# Patient Record
Sex: MALE | Race: WHITE | Employment: UNEMPLOYED | URBAN - METROPOLITAN AREA
[De-identification: names, ages, dates, MRNs, and addresses within clinical notes are randomized per-mention and may not be internally consistent; named-entity substitution may affect disease eponyms.]

---

## 2021-04-14 ENCOUNTER — APPOINTMENT (OUTPATIENT)
Dept: CT IMAGING | Age: 86
DRG: 446 | End: 2021-04-14
Payer: COMMERCIAL

## 2021-04-14 ENCOUNTER — HOSPITAL ENCOUNTER (INPATIENT)
Age: 86
LOS: 3 days | Discharge: HOME | DRG: 446 | End: 2021-04-17
Attending: INTERNAL MEDICINE | Admitting: INTERNAL MEDICINE
Payer: COMMERCIAL

## 2021-04-14 DIAGNOSIS — K81.0 ACUTE CHOLECYSTITIS: Primary | ICD-10-CM

## 2021-04-14 DIAGNOSIS — K81.0 ACUTE CHOLECYSTITIS: ICD-10-CM

## 2021-04-14 DIAGNOSIS — D72.829 LEUKOCYTOSIS, UNSPECIFIED TYPE: ICD-10-CM

## 2021-04-14 LAB
-: ABNORMAL
ABSOLUTE EOS #: <0.03 K/UL (ref 0–0.44)
ABSOLUTE IMMATURE GRANULOCYTE: 0.3 K/UL (ref 0–0.3)
ABSOLUTE LYMPH #: 0.94 K/UL (ref 1.1–3.7)
ABSOLUTE MONO #: 1.49 K/UL (ref 0.1–1.2)
ALBUMIN SERPL-MCNC: 3.9 G/DL (ref 3.5–5.2)
ALBUMIN/GLOBULIN RATIO: NORMAL (ref 1–2.5)
ALP BLD-CCNC: 92 U/L (ref 40–129)
ALT SERPL-CCNC: 10 U/L (ref 5–41)
AMORPHOUS: ABNORMAL
AMYLASE: 60 U/L (ref 28–100)
ANION GAP SERPL CALCULATED.3IONS-SCNC: 14 MMOL/L (ref 9–17)
AST SERPL-CCNC: 23 U/L
BACTERIA: ABNORMAL
BASOPHILS # BLD: 0 % (ref 0–2)
BASOPHILS ABSOLUTE: 0.06 K/UL (ref 0–0.2)
BILIRUB SERPL-MCNC: 1.06 MG/DL (ref 0.3–1.2)
BILIRUBIN DIRECT: 0.29 MG/DL
BILIRUBIN URINE: NEGATIVE
BILIRUBIN, INDIRECT: 0.77 MG/DL (ref 0–1)
BUN BLDV-MCNC: 16 MG/DL (ref 8–23)
BUN/CREAT BLD: 16 (ref 9–20)
CALCIUM SERPL-MCNC: 9.4 MG/DL (ref 8.6–10.4)
CASTS UA: ABNORMAL /LPF
CHLORIDE BLD-SCNC: 99 MMOL/L (ref 98–107)
CO2: 21 MMOL/L (ref 20–31)
COLOR: YELLOW
COMMENT UA: ABNORMAL
CREAT SERPL-MCNC: 1 MG/DL (ref 0.7–1.2)
CRYSTALS, UA: ABNORMAL /HPF
DIFFERENTIAL TYPE: ABNORMAL
EOSINOPHILS RELATIVE PERCENT: 0 % (ref 1–4)
EPITHELIAL CELLS UA: ABNORMAL /HPF (ref 0–5)
GFR AFRICAN AMERICAN: >60 ML/MIN
GFR NON-AFRICAN AMERICAN: >60 ML/MIN
GFR SERPL CREATININE-BSD FRML MDRD: ABNORMAL ML/MIN/{1.73_M2}
GFR SERPL CREATININE-BSD FRML MDRD: ABNORMAL ML/MIN/{1.73_M2}
GLOBULIN: NORMAL G/DL (ref 1.5–3.8)
GLUCOSE BLD-MCNC: 121 MG/DL (ref 70–99)
GLUCOSE URINE: NEGATIVE
HCT VFR BLD CALC: 38.3 % (ref 40.7–50.3)
HEMOGLOBIN: 12.9 G/DL (ref 13–17)
IMMATURE GRANULOCYTES: 1 %
KETONES, URINE: NEGATIVE
LEUKOCYTE ESTERASE, URINE: NEGATIVE
LIPASE: 23 U/L (ref 13–60)
LYMPHOCYTES # BLD: 5 % (ref 24–43)
MCH RBC QN AUTO: 32 PG (ref 25.2–33.5)
MCHC RBC AUTO-ENTMCNC: 33.7 G/DL (ref 28.4–34.8)
MCV RBC AUTO: 95 FL (ref 82.6–102.9)
MONOCYTES # BLD: 7 % (ref 3–12)
MUCUS: ABNORMAL
NITRITE, URINE: NEGATIVE
NRBC AUTOMATED: 0 PER 100 WBC
OTHER OBSERVATIONS UA: ABNORMAL
PDW BLD-RTO: 13.7 % (ref 11.8–14.4)
PH UA: 5 (ref 5–8)
PLATELET # BLD: 194 K/UL (ref 138–453)
PLATELET ESTIMATE: ABNORMAL
PMV BLD AUTO: 10.8 FL (ref 8.1–13.5)
POTASSIUM SERPL-SCNC: 4.1 MMOL/L (ref 3.7–5.3)
PROTEIN UA: ABNORMAL
RBC # BLD: 4.03 M/UL (ref 4.21–5.77)
RBC # BLD: ABNORMAL 10*6/UL
RBC UA: ABNORMAL /HPF (ref 0–2)
RENAL EPITHELIAL, UA: ABNORMAL /HPF
SEG NEUTROPHILS: 87 % (ref 36–65)
SEGMENTED NEUTROPHILS ABSOLUTE COUNT: 17.94 K/UL (ref 1.5–8.1)
SODIUM BLD-SCNC: 134 MMOL/L (ref 135–144)
SPECIFIC GRAVITY UA: 1.03 (ref 1–1.03)
TOTAL PROTEIN: 7.6 G/DL (ref 6.4–8.3)
TRICHOMONAS: ABNORMAL
TURBIDITY: CLEAR
URINE HGB: ABNORMAL
UROBILINOGEN, URINE: NORMAL
WBC # BLD: 20.8 K/UL (ref 3.5–11.3)
WBC # BLD: ABNORMAL 10*3/UL
WBC UA: ABNORMAL /HPF (ref 0–5)
YEAST: ABNORMAL

## 2021-04-14 PROCEDURE — 1200000000 HC SEMI PRIVATE

## 2021-04-14 PROCEDURE — 83690 ASSAY OF LIPASE: CPT

## 2021-04-14 PROCEDURE — 6360000004 HC RX CONTRAST MEDICATION: Performed by: NURSE PRACTITIONER

## 2021-04-14 PROCEDURE — 85025 COMPLETE CBC W/AUTO DIFF WBC: CPT

## 2021-04-14 PROCEDURE — 96365 THER/PROPH/DIAG IV INF INIT: CPT

## 2021-04-14 PROCEDURE — 6360000002 HC RX W HCPCS: Performed by: NURSE PRACTITIONER

## 2021-04-14 PROCEDURE — 74177 CT ABD & PELVIS W/CONTRAST: CPT

## 2021-04-14 PROCEDURE — 81001 URINALYSIS AUTO W/SCOPE: CPT

## 2021-04-14 PROCEDURE — 80048 BASIC METABOLIC PNL TOTAL CA: CPT

## 2021-04-14 PROCEDURE — 2580000003 HC RX 258: Performed by: NURSE PRACTITIONER

## 2021-04-14 PROCEDURE — 99283 EMERGENCY DEPT VISIT LOW MDM: CPT

## 2021-04-14 PROCEDURE — 80076 HEPATIC FUNCTION PANEL: CPT

## 2021-04-14 PROCEDURE — 82150 ASSAY OF AMYLASE: CPT

## 2021-04-14 RX ORDER — ALBUTEROL SULFATE 90 UG/1
AEROSOL, METERED RESPIRATORY (INHALATION)
COMMUNITY
Start: 2020-07-20

## 2021-04-14 RX ORDER — SODIUM CHLORIDE 0.9 % (FLUSH) 0.9 %
10 SYRINGE (ML) INJECTION PRN
Status: DISCONTINUED | OUTPATIENT
Start: 2021-04-14 | End: 2021-04-15 | Stop reason: SDUPTHER

## 2021-04-14 RX ORDER — METOPROLOL SUCCINATE 25 MG/1
12.5 TABLET, EXTENDED RELEASE ORAL NIGHTLY
COMMUNITY
Start: 2021-03-24

## 2021-04-14 RX ORDER — 0.9 % SODIUM CHLORIDE 0.9 %
50 INTRAVENOUS SOLUTION INTRAVENOUS ONCE
Status: COMPLETED | OUTPATIENT
Start: 2021-04-14 | End: 2021-04-14

## 2021-04-14 RX ORDER — DIGOXIN 125 MCG
TABLET ORAL
COMMUNITY
Start: 2021-02-17

## 2021-04-14 RX ORDER — SODIUM CHLORIDE 9 MG/ML
INJECTION, SOLUTION INTRAVENOUS CONTINUOUS
Status: DISCONTINUED | OUTPATIENT
Start: 2021-04-14 | End: 2021-04-15 | Stop reason: SDUPTHER

## 2021-04-14 RX ADMIN — SODIUM CHLORIDE 50 ML: 9 INJECTION, SOLUTION INTRAVENOUS at 21:59

## 2021-04-14 RX ADMIN — IOPAMIDOL 75 ML: 755 INJECTION, SOLUTION INTRAVENOUS at 21:59

## 2021-04-14 RX ADMIN — PIPERACILLIN SODIUM AND TAZOBACTAM SODIUM 4500 MG: 4; .5 INJECTION, POWDER, LYOPHILIZED, FOR SOLUTION INTRAVENOUS at 23:15

## 2021-04-14 RX ADMIN — SODIUM CHLORIDE, PRESERVATIVE FREE 10 ML: 5 INJECTION INTRAVENOUS at 21:59

## 2021-04-14 RX ADMIN — SODIUM CHLORIDE: 9 INJECTION, SOLUTION INTRAVENOUS at 21:16

## 2021-04-14 ASSESSMENT — ENCOUNTER SYMPTOMS
SORE THROAT: 0
ABDOMINAL PAIN: 1
CONSTIPATION: 0
SINUS PRESSURE: 0
VOMITING: 0
WHEEZING: 0
COUGH: 0
SHORTNESS OF BREATH: 0
COLOR CHANGE: 0
DIARRHEA: 0
RHINORRHEA: 0
NAUSEA: 1

## 2021-04-14 ASSESSMENT — PAIN SCALES - GENERAL: PAINLEVEL_OUTOF10: 6

## 2021-04-15 PROBLEM — I48.91 ATRIAL FIBRILLATION (HCC): Status: ACTIVE | Noted: 2021-04-15

## 2021-04-15 PROBLEM — Z78.9 LANGUAGE BARRIER: Status: ACTIVE | Noted: 2021-04-15

## 2021-04-15 PROBLEM — J44.9 COPD (CHRONIC OBSTRUCTIVE PULMONARY DISEASE) (HCC): Status: ACTIVE | Noted: 2021-04-15

## 2021-04-15 PROBLEM — F01.50 VASCULAR DEMENTIA (HCC): Status: ACTIVE | Noted: 2021-04-15

## 2021-04-15 PROBLEM — D72.829 LEUKOCYTOSIS: Status: ACTIVE | Noted: 2021-04-15

## 2021-04-15 PROBLEM — I25.10 CAD (CORONARY ARTERY DISEASE): Status: ACTIVE | Noted: 2021-04-15

## 2021-04-15 LAB
ALBUMIN SERPL-MCNC: 3.3 G/DL (ref 3.5–5.2)
ALBUMIN/GLOBULIN RATIO: ABNORMAL (ref 1–2.5)
ALP BLD-CCNC: 79 U/L (ref 40–129)
ALT SERPL-CCNC: 7 U/L (ref 5–41)
AMYLASE: 48 U/L (ref 28–100)
ANION GAP SERPL CALCULATED.3IONS-SCNC: 12 MMOL/L (ref 9–17)
AST SERPL-CCNC: 18 U/L
BILIRUB SERPL-MCNC: 1.12 MG/DL (ref 0.3–1.2)
BUN BLDV-MCNC: 13 MG/DL (ref 8–23)
BUN/CREAT BLD: 14 (ref 9–20)
CALCIUM IONIZED: 1.2 MMOL/L (ref 1.13–1.33)
CALCIUM SERPL-MCNC: 8.7 MG/DL (ref 8.6–10.4)
CHLORIDE BLD-SCNC: 102 MMOL/L (ref 98–107)
CO2: 20 MMOL/L (ref 20–31)
CREAT SERPL-MCNC: 0.9 MG/DL (ref 0.7–1.2)
DIGOXIN DATE LAST DOSE: ABNORMAL
DIGOXIN DOSE AMOUNT: ABNORMAL
DIGOXIN DOSE TIME: ABNORMAL
DIGOXIN LEVEL: 0.4 NG/ML (ref 0.5–2)
GFR AFRICAN AMERICAN: >60 ML/MIN
GFR NON-AFRICAN AMERICAN: >60 ML/MIN
GFR SERPL CREATININE-BSD FRML MDRD: ABNORMAL ML/MIN/{1.73_M2}
GFR SERPL CREATININE-BSD FRML MDRD: ABNORMAL ML/MIN/{1.73_M2}
GLUCOSE BLD-MCNC: 110 MG/DL (ref 70–99)
HCT VFR BLD CALC: 37.4 % (ref 40.7–50.3)
HEMOGLOBIN: 12.7 G/DL (ref 13–17)
INR BLD: 1.4
LIPASE: 20 U/L (ref 13–60)
MAGNESIUM: 1.5 MG/DL (ref 1.6–2.6)
MCH RBC QN AUTO: 31.6 PG (ref 25.2–33.5)
MCHC RBC AUTO-ENTMCNC: 34 G/DL (ref 28.4–34.8)
MCV RBC AUTO: 93 FL (ref 82.6–102.9)
NRBC AUTOMATED: 0 PER 100 WBC
PDW BLD-RTO: 13.4 % (ref 11.8–14.4)
PHOSPHORUS: 2.1 MG/DL (ref 2.5–4.5)
PLATELET # BLD: 180 K/UL (ref 138–453)
PMV BLD AUTO: 10.4 FL (ref 8.1–13.5)
POTASSIUM SERPL-SCNC: 3.4 MMOL/L (ref 3.7–5.3)
PROTHROMBIN TIME: 16.5 SEC (ref 11.5–14.2)
RBC # BLD: 4.02 M/UL (ref 4.21–5.77)
SODIUM BLD-SCNC: 134 MMOL/L (ref 135–144)
TOTAL PROTEIN: 6.6 G/DL (ref 6.4–8.3)
TRIGL SERPL-MCNC: 43 MG/DL
WBC # BLD: 18.3 K/UL (ref 3.5–11.3)

## 2021-04-15 PROCEDURE — 82330 ASSAY OF CALCIUM: CPT

## 2021-04-15 PROCEDURE — 6360000002 HC RX W HCPCS: Performed by: NURSE PRACTITIONER

## 2021-04-15 PROCEDURE — 99223 1ST HOSP IP/OBS HIGH 75: CPT | Performed by: INTERNAL MEDICINE

## 2021-04-15 PROCEDURE — 85027 COMPLETE CBC AUTOMATED: CPT

## 2021-04-15 PROCEDURE — 6370000000 HC RX 637 (ALT 250 FOR IP): Performed by: NURSE PRACTITIONER

## 2021-04-15 PROCEDURE — 85610 PROTHROMBIN TIME: CPT

## 2021-04-15 PROCEDURE — 1200000000 HC SEMI PRIVATE

## 2021-04-15 PROCEDURE — 2580000003 HC RX 258: Performed by: NURSE PRACTITIONER

## 2021-04-15 PROCEDURE — 36415 COLL VENOUS BLD VENIPUNCTURE: CPT

## 2021-04-15 PROCEDURE — APPSS45 APP SPLIT SHARED TIME 31-45 MINUTES: Performed by: NURSE PRACTITIONER

## 2021-04-15 PROCEDURE — 80162 ASSAY OF DIGOXIN TOTAL: CPT

## 2021-04-15 PROCEDURE — 84478 ASSAY OF TRIGLYCERIDES: CPT

## 2021-04-15 PROCEDURE — 83690 ASSAY OF LIPASE: CPT

## 2021-04-15 PROCEDURE — 2500000003 HC RX 250 WO HCPCS: Performed by: NURSE PRACTITIONER

## 2021-04-15 PROCEDURE — 83735 ASSAY OF MAGNESIUM: CPT

## 2021-04-15 PROCEDURE — 84100 ASSAY OF PHOSPHORUS: CPT

## 2021-04-15 PROCEDURE — 80053 COMPREHEN METABOLIC PANEL: CPT

## 2021-04-15 PROCEDURE — 82150 ASSAY OF AMYLASE: CPT

## 2021-04-15 RX ORDER — POTASSIUM CHLORIDE 7.45 MG/ML
10 INJECTION INTRAVENOUS PRN
Status: DISCONTINUED | OUTPATIENT
Start: 2021-04-15 | End: 2021-04-17 | Stop reason: HOSPADM

## 2021-04-15 RX ORDER — DIPHENHYDRAMINE HYDROCHLORIDE 50 MG/ML
25 INJECTION INTRAMUSCULAR; INTRAVENOUS ONCE
Status: COMPLETED | OUTPATIENT
Start: 2021-04-15 | End: 2021-04-15

## 2021-04-15 RX ORDER — METOPROLOL SUCCINATE 25 MG/1
12.5 TABLET, EXTENDED RELEASE ORAL NIGHTLY
Status: DISCONTINUED | OUTPATIENT
Start: 2021-04-15 | End: 2021-04-17 | Stop reason: HOSPADM

## 2021-04-15 RX ORDER — SODIUM CHLORIDE 0.9 % (FLUSH) 0.9 %
5-40 SYRINGE (ML) INJECTION EVERY 12 HOURS SCHEDULED
Status: DISCONTINUED | OUTPATIENT
Start: 2021-04-15 | End: 2021-04-17 | Stop reason: HOSPADM

## 2021-04-15 RX ORDER — LANOLIN ALCOHOL/MO/W.PET/CERES
3 CREAM (GRAM) TOPICAL NIGHTLY PRN
Status: DISCONTINUED | OUTPATIENT
Start: 2021-04-15 | End: 2021-04-17 | Stop reason: HOSPADM

## 2021-04-15 RX ORDER — SODIUM CHLORIDE 0.9 % (FLUSH) 0.9 %
5-40 SYRINGE (ML) INJECTION PRN
Status: DISCONTINUED | OUTPATIENT
Start: 2021-04-15 | End: 2021-04-17 | Stop reason: HOSPADM

## 2021-04-15 RX ORDER — PROMETHAZINE HYDROCHLORIDE 12.5 MG/1
12.5 TABLET ORAL EVERY 6 HOURS PRN
Status: DISCONTINUED | OUTPATIENT
Start: 2021-04-15 | End: 2021-04-17 | Stop reason: HOSPADM

## 2021-04-15 RX ORDER — MAGNESIUM SULFATE 1 G/100ML
1000 INJECTION INTRAVENOUS PRN
Status: DISCONTINUED | OUTPATIENT
Start: 2021-04-15 | End: 2021-04-17 | Stop reason: HOSPADM

## 2021-04-15 RX ORDER — ALBUTEROL SULFATE 2.5 MG/3ML
2.5 SOLUTION RESPIRATORY (INHALATION)
Status: DISCONTINUED | OUTPATIENT
Start: 2021-04-15 | End: 2021-04-15

## 2021-04-15 RX ORDER — HYDROCODONE BITARTRATE AND ACETAMINOPHEN 5; 325 MG/1; MG/1
1 TABLET ORAL EVERY 4 HOURS PRN
Status: DISCONTINUED | OUTPATIENT
Start: 2021-04-15 | End: 2021-04-17 | Stop reason: HOSPADM

## 2021-04-15 RX ORDER — DIGOXIN 125 MCG
125 TABLET ORAL DAILY
Status: DISCONTINUED | OUTPATIENT
Start: 2021-04-15 | End: 2021-04-17 | Stop reason: HOSPADM

## 2021-04-15 RX ORDER — ALBUTEROL SULFATE 90 UG/1
2 AEROSOL, METERED RESPIRATORY (INHALATION) EVERY 6 HOURS PRN
Status: DISCONTINUED | OUTPATIENT
Start: 2021-04-15 | End: 2021-04-17 | Stop reason: HOSPADM

## 2021-04-15 RX ORDER — HYDROCODONE BITARTRATE AND ACETAMINOPHEN 5; 325 MG/1; MG/1
2 TABLET ORAL EVERY 4 HOURS PRN
Status: DISCONTINUED | OUTPATIENT
Start: 2021-04-15 | End: 2021-04-17 | Stop reason: HOSPADM

## 2021-04-15 RX ORDER — SODIUM CHLORIDE 9 MG/ML
INJECTION, SOLUTION INTRAVENOUS CONTINUOUS
Status: DISCONTINUED | OUTPATIENT
Start: 2021-04-15 | End: 2021-04-17

## 2021-04-15 RX ORDER — ACETAMINOPHEN 325 MG/1
650 TABLET ORAL EVERY 4 HOURS PRN
Status: DISCONTINUED | OUTPATIENT
Start: 2021-04-15 | End: 2021-04-17 | Stop reason: HOSPADM

## 2021-04-15 RX ORDER — SODIUM CHLORIDE 9 MG/ML
25 INJECTION, SOLUTION INTRAVENOUS PRN
Status: DISCONTINUED | OUTPATIENT
Start: 2021-04-15 | End: 2021-04-17 | Stop reason: HOSPADM

## 2021-04-15 RX ORDER — ONDANSETRON 2 MG/ML
4 INJECTION INTRAMUSCULAR; INTRAVENOUS EVERY 6 HOURS PRN
Status: DISCONTINUED | OUTPATIENT
Start: 2021-04-15 | End: 2021-04-17 | Stop reason: HOSPADM

## 2021-04-15 RX ADMIN — PIPERACILLIN AND TAZOBACTAM 3375 MG: 3; .375 INJECTION, POWDER, LYOPHILIZED, FOR SOLUTION INTRAVENOUS at 06:02

## 2021-04-15 RX ADMIN — METOPROLOL SUCCINATE 12.5 MG: 25 TABLET, EXTENDED RELEASE ORAL at 20:51

## 2021-04-15 RX ADMIN — FAMOTIDINE 20 MG: 10 INJECTION, SOLUTION INTRAVENOUS at 16:10

## 2021-04-15 RX ADMIN — SODIUM CHLORIDE: 9 INJECTION, SOLUTION INTRAVENOUS at 16:08

## 2021-04-15 RX ADMIN — DIPHENHYDRAMINE HYDROCHLORIDE 25 MG: 50 INJECTION, SOLUTION INTRAMUSCULAR; INTRAVENOUS at 20:51

## 2021-04-15 RX ADMIN — DIGOXIN 125 MCG: 125 TABLET ORAL at 16:10

## 2021-04-15 RX ADMIN — PIPERACILLIN AND TAZOBACTAM 3375 MG: 3; .375 INJECTION, POWDER, LYOPHILIZED, FOR SOLUTION INTRAVENOUS at 16:10

## 2021-04-15 RX ADMIN — PIPERACILLIN AND TAZOBACTAM 3375 MG: 3; .375 INJECTION, POWDER, LYOPHILIZED, FOR SOLUTION INTRAVENOUS at 23:30

## 2021-04-15 NOTE — PROGRESS NOTES
Writer attempted to contact the pts daughter Lang Pradhan at . A message was let, awaiting a return phone call.

## 2021-04-15 NOTE — CONSULTS
General Surgery:  Consult Note        PATIENT NAME: Shawn Lora   YOB: 1930    ADMISSION DATE: 4/14/2021  8:35 PM     Consulted Physician: Dr. Mandy Spain DATE: 4/15/2021    Chief Complaint:  Abdominal pain  Consult Regarding:  Cholecystitis     HISTORY OF PRESENT ILLNESS:  The patient is a 80 y.o. male non-english speaking male. History taken from chart review, attempted to call daughter w/out answer at this time. Pt presented to ED last night with abdominal pain for > 3 days. Pain started Monday after eating \"a lot of food\". Had nausea but no emesis. Bowels and bladder working normal per him. No fevers or chills. Pt lives in assisted living, able to preform his own adls. H/o CVA, vasc dementia, afib on eliquis, copd, cad, htn. Has had exploratory laparotomy, which sternal wires used to close the fascia. Past Medical History:        Diagnosis Date    Atrial fibrillation ([facility]     CAD (coronary artery disease)     COPD (chronic obstructive pulmonary disease) (HCC)     CVA (cerebral vascular accident) ([facility]     Hypertension     NSTEMI (non-ST elevated myocardial infarction) ([facility] 2016    Vascular dementia ([facility]        Past Surgical History:        Procedure Laterality Date    APPENDECTOMY      HERNIA REPAIR      HIP FUSION         Medications Prior to Admission:   Not in a hospital admission. Allergies:  Patient has no known allergies.     Social History:   Social History     Socioeconomic History    Marital status:      Spouse name: Not on file    Number of children: Not on file    Years of education: Not on file    Highest education level: Not on file   Occupational History    Not on file   Social Needs    Financial resource strain: Not on file    Food insecurity     Worry: Not on file     Inability: Not on file    Transportation needs     Medical: Not on file     Non-medical: Not on file   Tobacco Use    Smoking status: Never Smoker   Substance and Sexual Activity    Alcohol use: Not Currently    Drug use: Not on file    Sexual activity: Not on file   Lifestyle    Physical activity     Days per week: Not on file     Minutes per session: Not on file    Stress: Not on file   Relationships    Social connections     Talks on phone: Not on file     Gets together: Not on file     Attends [employer]ism service: Not on file     Active member of club or organization: Not on file     Attends meetings of clubs or organizations: Not on file     Relationship status: Not on file    Intimate partner violence     Fear of current or ex partner: Not on file     Emotionally abused: Not on file     Physically abused: Not on file     Forced sexual activity: Not on file   Other Topics Concern    Not on file   Social History Narrative    Not on file       Family History:   History reviewed. No pertinent family history. REVIEW OF SYSTEMS:    Unable to obtain due to language barrier. PHYSICAL EXAM:    VITALS:  /69   Pulse 96   Temp 98.4 °F (36.9 °C) (Oral)   Resp 14   Ht 5' 10\" (1.778 m)   Wt 130 lb (59 kg)   SpO2 93%   BMI 18.65 kg/m²   INTAKE/OUTPUT:   No intake or output data in the 24 hours ending 04/15/21 0647    CONSTITUTIONAL:  awake, alert, not distressed and normal weight  HEENT: Normocephalic/atraumatic, without obvious abnormality. NECK:  Supple, symmetrical, trachea midline   CARDIOVASCULAR: irregularly irregular s1+s2  LUNGS: equal and symmetric chest rise/fall, non-labored. ABDOMEN: soft, nd, ttp w/ guarding RUQ, +arellano sign. MUSCULOSKELETAL: Muscle strength intact in all extremities bilaterally. NEUROLOGIC: Gross motor intact without focal weakness. SKIN: No cyanosis, rashes, or edema noted.      CBC with Differential:    Lab Results   Component Value Date    WBC 20.8 04/14/2021    RBC 4.03 04/14/2021    HGB 12.9 04/14/2021    HCT 38.3 04/14/2021     04/14/2021    MCV 95.0 04/14/2021    MCH 32.0 04/14/2021    MCHC 33.7 04/14/2021    RDW 13.7 04/14/2021    LYMPHOPCT 5 04/14/2021    MONOPCT 7 04/14/2021    BASOPCT 0 04/14/2021    MONOSABS 1.49 04/14/2021    LYMPHSABS 0.94 04/14/2021    EOSABS <0.03 04/14/2021    BASOSABS 0.06 04/14/2021    DIFFTYPE NOT REPORTED 04/14/2021     BMP:    Lab Results   Component Value Date     04/14/2021    K 4.1 04/14/2021    CL 99 04/14/2021    CO2 21 04/14/2021    BUN 16 04/14/2021    LABALBU 3.9 04/14/2021    CREATININE 1.00 04/14/2021    CALCIUM 9.4 04/14/2021    GFRAA >60 04/14/2021    LABGLOM >60 04/14/2021    GLUCOSE 121 04/14/2021     Hepatic Function Panel:    Lab Results   Component Value Date    ALKPHOS 92 04/14/2021    ALT 10 04/14/2021    AST 23 04/14/2021    PROT 7.6 04/14/2021    BILITOT 1.06 04/14/2021    BILIDIR 0.29 04/14/2021    IBILI 0.77 04/14/2021    LABALBU 3.9 04/14/2021       Pertinent Radiology:   Ct Abdomen Pelvis W Iv Contrast Additional Contrast? None    Result Date: 4/14/2021  EXAMINATION: CT OF THE ABDOMEN AND PELVIS WITH CONTRAST 4/14/2021 9:58 pm TECHNIQUE: CT of the abdomen and pelvis was performed with the administration of intravenous contrast. Multiplanar reformatted images are provided for review. Dose modulation, iterative reconstruction, and/or weight based adjustment of the mA/kV was utilized to reduce the radiation dose to as low as reasonably achievable. COMPARISON: None. HISTORY: ORDERING SYSTEM PROVIDED HISTORY: abd pain TECHNOLOGIST PROVIDED HISTORY: [facility] pain Decision Support Exception->Emergency Medical Condition (MA) Reason for Exam: pain Acuity: Unknown Type of Exam: Unknown FINDINGS: Lower Chest: Calcific coronary artery disease. 13 x 27 mm nodule right lung base. Organs: Metallic wires midline anterior abdominal wall incision. The liver, spleen, pancreas, and adrenals appear normal.  Gallbladder is distended with pericholecystic fluid. Gallbladder sludge or gravel noted.  Kidneys appear normal. The bladder appears normal. GI/Bowel: Air-fluid levels noted in the small large bowel. Stomach normal.. Colonic diverticulosis. Appendix not identified. Pelvis: Prostate prominent. Peritoneum/Retroperitoneum: The abdominal aorta and iliac arteries are normal in caliber. There is no pathologic adenopathy. Bones/Soft Tissues: Remote fracture left hip with superior migration and severe DJD. Moderate dextroconvex scoliosis. 13 x 27 mm nodule right lung base. Acute cholecystitis. Recommend gallbladder ultrasound. Postop ileus. RECOMMENDATIONS: 20 mm right solid pulmonary nodule detected on incomplete chest CT. Recommend prompt non-contrast Chest CT for further evaluation. These guidelines do not apply to immunocompromised patients and patients with cancer. Follow up in patients with significant comorbidities as clinically warranted. For lung cancer screening, adhere to Lung-RADS guidelines. Reference: Radiology. 2017; 284(1):228-43. ASSESSMENT:  Active Hospital Problems    Diagnosis Date Noted    Atrial fibrillation [facility]) [I48.91] 04/15/2021    COPD (chronic obstructive pulmonary disease) (HCC) [J44.9] 04/15/2021    CAD (coronary artery disease) [I25.10] 04/15/2021    Vascular dementia ([facility] [facility] [F01.50] 04/15/2021    Leukocytosis [D72.829] 04/15/2021    Acute cholecystitis [K81.0] 04/14/2021       1. 89 w/ acute cholecystitis     Plan:  1. Continue medical mgmt and supportive care per primary  2. Hold eliquis  3. Npo  4. rec IR perc cholecystotomy tube give length of symptoms, age, and medical comorbidities   5. Abx: zosyn      Electronically signed by Lakisha Brenner DO  on 4/15/2021 at 6:47 AM     Attending Physician Statement  I have discussed the case, including pertinent history and exam findings with the resident. I agree with the assessment, plan and orders as documented by the resident. Patient seen and examined.   Extensive inflammatory process that has been ongoing for several days prior to his presentation to the hospital. Discussed with the patient's daughter, Renetta Parkinson and she understands the difficult situation. We have held his Eliquis with plans for percutaneous cholecystostomy tube tomorrow afternoon.  Interventional radiology has been consulted

## 2021-04-15 NOTE — ED PROVIDER NOTES
[address] [place] [address] ED  eMERGENCY dEPARTMENT eNCOUnter      Pt Name: Chelsea Flores  MRN: [id number]  Sherlygfkailee 10/16/1930  Date of evaluation: 4/14/2021  Provider: [address] [place] [address] NP, CHELSEY Ribera [id number]       Chief Complaint   Patient presents with    Abdominal Pain         HISTORY OF PRESENT ILLNESS  (Location/Symptom, Timing/Onset, Context/Setting, Quality, Duration, Modifying Factors, Severity.)   Chelsea Flores is a 80 y.o. male who presents to the emergency department private vehicle for evaluation of abdominal pain. Patient has generalized abdominal pain that has had for the last 3 days. The daughter who interprets states that he has had some nausea without vomiting. She states that he has a history of a hernia repair and an appendectomy. He did develop a bowel obstruction after hernia repair that required a small segment of his bowels to be removed. He states that he is also had some low-grade fevers. Nursing Notes were reviewed. ALLERGIES     Patient has no known allergies. CURRENT MEDICATIONS       Previous Medications    ALBUTEROL SULFATE  (90 BASE) MCG/ACT INHALER    INHALE 2 PUFFS EVERY 4-6 HOURS AS NEEDED FOR WHEEZE    APIXABAN (ELIQUIS) 2.5 MG TABS TABLET    Take 2.5 mg by mouth 2 times daily    DIGOXIN (LANOXIN) 125 MCG TABLET    take 1 tablet by mouth once daily    METOPROLOL SUCCINATE (TOPROL XL) 25 MG EXTENDED RELEASE TABLET    Take 12.5 mg by mouth nightly       PAST MEDICAL HISTORY         Diagnosis Date    CVA (cerebral vascular accident) ([address]     Hypertension        SURGICAL HISTORY           Procedure Laterality Date    APPENDECTOMY      HERNIA REPAIR      HIP FUSION           FAMILY HISTORY     History reviewed. No pertinent family history. No family status information on file. SOCIAL HISTORY      reports that he has never smoked. He does not have any smokeless tobacco history on file. He reports previous alcohol use.     REVIEW OF SYSTEMS (2-9 systems for level 4, 10 or more for level 5)     Review of Systems   Constitutional: Negative for chills, fever and unexpected weight change. HENT: Negative for congestion, rhinorrhea, sinus pressure and sore throat. Respiratory: Negative for cough, shortness of breath and wheezing. Cardiovascular: Negative for chest pain and palpitations. Gastrointestinal: Positive for abdominal pain and nausea. Negative for constipation, diarrhea and vomiting. Genitourinary: Negative for dysuria and hematuria. Musculoskeletal: Negative for arthralgias and myalgias. Skin: Negative for color change and rash. Neurological: Negative for dizziness, weakness and headaches. Hematological: Negative for adenopathy. All other systems reviewed and are negative. Except as noted above the remainder of the review of systems was reviewed and negative. PHYSICAL EXAM    (up to 7 for level 4, 8 or more for level 5)     ED Triage Vitals   BP Temp Temp src Pulse Resp SpO2 Height Weight   04/14/21 1830 04/14/21 1830 -- 04/14/21 1830 04/14/21 1830 04/14/21 1830 -- 04/14/21 1829   (!) 124/56 100 °F (37.8 °C)  82 18 94 %  130 lb (59 kg)       Physical Exam  Vitals signs reviewed. Constitutional:       Appearance: He is well-developed. HENT:      Head: Normocephalic and atraumatic. Eyes:      Conjunctiva/sclera: Conjunctivae normal.      Pupils: Pupils are equal, round, and reactive to light. Neck:      Musculoskeletal: Normal range of motion and neck supple. Cardiovascular:      Rate and Rhythm: Normal rate and regular rhythm. Pulmonary:      Effort: Pulmonary effort is normal. No respiratory distress. Breath sounds: Normal breath sounds. No stridor. Abdominal:      General: Bowel sounds are normal.      Palpations: Abdomen is soft. Tenderness: There is generalized abdominal tenderness. Musculoskeletal: Normal range of motion. Lymphadenopathy:      Cervical: No cervical adenopathy. Skin:     General: Skin is warm and dry. Findings: No rash. Neurological:      Mental Status: He is alert and oriented to person, place, and time. RADIOLOGY:   Non-plain film images such as CT, Ultrasound and MRI are read by the radiologist. Kulwinder Elena radiographic images are visualized and preliminarily interpreted by the emergency physician with the below findings:    Ct Abdomen Pelvis W Iv Contrast Additional Contrast? None    Result Date: 4/14/2021  EXAMINATION: CT OF THE ABDOMEN AND PELVIS WITH CONTRAST 4/14/2021 9:58 pm TECHNIQUE: CT of the abdomen and pelvis was performed with the administration of intravenous contrast. Multiplanar reformatted images are provided for review. Dose modulation, iterative reconstruction, and/or weight based adjustment of the mA/kV was utilized to reduce the radiation dose to as low as reasonably achievable. COMPARISON: None. HISTORY: ORDERING SYSTEM PROVIDED HISTORY: [facility] pain TECHNOLOGIST PROVIDED HISTORY: [facility] pain Decision Support Exception->Emergency Medical Condition (MA) Reason for Exam: pain Acuity: Unknown Type of Exam: Unknown FINDINGS: Lower Chest: Calcific coronary artery disease. 13 x 27 mm nodule right lung base. Organs: Metallic wires midline anterior abdominal wall incision. The liver, spleen, pancreas, and adrenals appear normal.  Gallbladder is distended with pericholecystic fluid. Gallbladder sludge or gravel noted. Kidneys appear normal. The bladder appears normal. GI/Bowel: Air-fluid levels noted in the small large bowel. Stomach normal.. Colonic diverticulosis. Appendix not identified. Pelvis: Prostate prominent. Peritoneum/Retroperitoneum: The abdominal aorta and iliac arteries are normal in caliber. There is no pathologic adenopathy. Bones/Soft Tissues: Remote fracture left hip with superior migration and severe DJD. Moderate dextroconvex scoliosis. 13 x 27 mm nodule right lung base. Acute cholecystitis. Recommend gallbladder ultrasound. Postop ileus. RECOMMENDATIONS: 20 mm right solid pulmonary nodule detected on incomplete chest CT. Recommend prompt non-contrast Chest CT for further evaluation. These guidelines do not apply to immunocompromised patients and patients with cancer. Follow up in patients with significant comorbidities as clinically warranted. For lung cancer screening, adhere to Lung-RADS guidelines. Reference: Radiology. 2017; 284(1):228-43. Interpretation per the Radiologist below, if available at the time of this note:    CT ABDOMEN PELVIS W IV CONTRAST Additional Contrast? None   Final Result   13 x 27 mm nodule right lung base. Acute cholecystitis. Recommend gallbladder ultrasound. Postop ileus. RECOMMENDATIONS:   20 mm right solid pulmonary nodule detected on incomplete chest CT. Recommend   prompt non-contrast Chest CT for further evaluation. These guidelines do not apply to immunocompromised patients and patients with   cancer. Follow up in patients with significant comorbidities as clinically   warranted. For lung cancer screening, adhere to Lung-RADS guidelines. Reference: Radiology. 2017; 284(1):228-43.                  LABS:  Labs Reviewed   CBC WITH AUTO DIFFERENTIAL - Abnormal; Notable for the following components:       Result Value    WBC 20.8 (*)     RBC 4.03 (*)     Hemoglobin 12.9 (*)     Hematocrit 38.3 (*)     Seg Neutrophils 87 (*)     Lymphocytes 5 (*)     Eosinophils % 0 (*)     Immature Granulocytes 1 (*)     Segs Absolute 17.94 (*)     Absolute Lymph # 0.94 (*)     Absolute Mono # 1.49 (*)     All other components within normal limits   BASIC METABOLIC PANEL - Abnormal; Notable for the following components:    Glucose 121 (*)     Sodium 134 (*)     All other components within normal limits   URINE RT REFLEX TO CULTURE - Abnormal; Notable for the following components:    Specific Gravity, UA 1.034 (*)     Urine Hgb 2+ (*)     Protein, UA 1+ (*)     All other components within normal limits   MICROSCOPIC URINALYSIS - Abnormal; Notable for the following components:    Bacteria, UA MODERATE (*)     All other components within normal limits   LIPASE   AMYLASE   HEPATIC FUNCTION PANEL       All other labs were within normal range or not returned as of this dictation. EMERGENCY DEPARTMENT COURSE and DIFFERENTIAL DIAGNOSIS/MDM:   Vitals:    Vitals:    04/14/21 1829 04/14/21 1830   BP:  (!) 124/56   Pulse:  82   Resp:  18   Temp:  100 °F (37.8 °C)   SpO2:  94%   Weight: 130 lb (59 kg)        Medical Decision Making: Patient was given IV fluids. He is found to have acute cholecystitis with leukocytosis. I spoke with Dr. Mirtha Braden from general surgery. He would like the patient started on IV Zosyn. Hold Eliquis they will see the patient tomorrow for evaluation. Admit to medicine. Case was discussed with internal medicine who accepts the patient for admission. Patient and family were updated on findings and plan of care  Medications   0.9 % sodium chloride infusion ( Intravenous New Bag 4/14/21 2116)   sodium chloride flush 0.9 % injection 10 mL (10 mLs Intravenous Given 4/14/21 2159)   piperacillin-tazobactam (ZOSYN) 4,500 mg in dextrose 5 % 100 mL IVPB (mini-bag) (4,500 mg Intravenous New Bag 4/14/21 2315)   0.9 % sodium chloride bolus (0 mLs Intravenous Stopped 4/14/21 2246)   iopamidol (ISOVUE-370) 76 % injection 75 mL (75 mLs Intravenous Given 4/14/21 2159)       CONSULTS:  IP CONSULT TO GENERAL SURGERY      FINAL IMPRESSION      1. Leukocytosis, unspecified type    2. Acute cholecystitis          DISPOSITION/PLAN   DISPOSITION Decision To Admit 04/14/2021 10:50:33 PM      PATIENT REFERRED TO:   No follow-up provider specified.     DISCHARGE MEDICATIONS:     New Prescriptions    No medications on file           (Please note that portions of this note were completed with a voice recognition program.  Efforts were made to edit the dictations but occasionally words are mis-transcribed.)    ILDEFONSO

## 2021-04-15 NOTE — PROGRESS NOTES
[facility] Pascal[facility] Acute cholecystit[facility] [K81.0] . Vitals:    04/15/21 0018   BP: 114/68   Pulse: 96   Resp: 12   Temp: 99.9 °F (37.7 °C)   SpO2: 92%   . Patients home meds are   Prior to Admission medications    Medication Sig Start Date End Date Taking?  Authorizing Provider   apixaban (ELIQUIS) 2.5 MG TABS tablet Take 2.5 mg by mouth 2 times daily 3/24/21  Yes Historical Provider, MD   metoprolol succinate (TOPROL XL) 25 MG extended release tablet Take 12.5 mg by mouth nightly 3/24/21  Yes Historical Provider, MD   albuterol sulfate  (90 Base) MCG/ACT inhaler INHALE 2 PUFFS EVERY 4-6 HOURS AS NEEDED FOR WHEEZE 7/20/20  Yes Historical Provider, MD   digoxin (LANOXIN) 125 MCG tablet take 1 tablet by mouth once daily 2/17/21   Historical Provider, MD   .  Recent Surgical History: None = 0     Assessment     Peak Flow (asthma only)    Predicted: na  Personal Best: na  PEF na  % Predicted na  Peak Flow : not applicable = 0    YKP0/YZX    FEV1 Predicted unable (Doesn't speak English)      FEV1 na    FEV1 % Predicted na  FVC na  IS volume na  IBW na  FIO2% 2L  SPO2 94  RR 12  Breath Sounds: Clear      · Bronchodilator assessment at level  1(Takes Albuterol MDI PRN at home)  · Hyperinflation assessment at level   · Secretion Management assessment at level    ·   · []    Bronchodilator Assessment  BRONCHODILATOR ASSESSMENT SCORE  Score 0 1 2 3 4 5   Breath Sounds   [x]  Patient Baseline []  No Wheeze good aeration []  Faint, scattered wheezing, good aeration []  Expiratory Wheezing and or moderately diminished []  Insp/Exp wheeze and/or very diminished []  Insp/Exp and/ or marked distress   Respiratory Rate   [x]  Patient Baseline []  Less than 20 []  Less than 20 []  20-25 []  Greater than 25 []  Greater than 25   Peak flow % of Pred or PB []  NA   []  Greater than 90%  []  81-90% []  71-80% []  Less than or equal to 70%  or unable to perform []  Unable due to Respiratory Distress   Dyspnea re [x]  Patient Baseline []  No SOB []  No SOB []  SOB on exertion []  SOB min activity []  At rest/acute   e FEV% Predicted       []  NA []  Above 69%  []  Unable []  Above 60-69%  []  Unable []  Above 50-59%  []  Unable []  Above 35-49%  []  Unable []  Less than 35%  []  Unable                 []  Hyperinflation Assessment  Score 1 2 3   CXR and Breath Sounds   []  Clear []  No atelectasis  Basilar aeration []  Atelectasis or absent basilar breath sounds   Incentive Spirometry Volume  (Per IBW)   []  Greater than or equal to 15ml/Kg []  less than 15ml/Kg []  less than 15ml/Kg   Surgery within last 2 weeks []  None or general   []  Abdominal or thoracic surgery  []  Abdominal or thoracic   Chronic Pulmonary Historyre []  No []  Yes []  Yes     []  Secretion Management Assessment  Score 1 2 3   Bilateral Breath Sounds   []  Occasional Rhonchi []  Scattered Rhonchi []  Course Rhonchi and/or poor aeration   Sputum    []  Small amount of thin secretions []  Moderate amount of viscous secretions []  Copius, Viscious Yellow/ Secretions   CXR as reported by physician []  clear  []  Unavailable []  Infiltrates and/or consolidation  []  Unavailable []  Mucus Plugging and or lobar consolidation  []  Unavailable   Cough []  Strong, productive cough []  Weak productive cough []  No cough or weak non-productive cough

## 2021-04-15 NOTE — PROGRESS NOTES
Writer attempted to use the video  to do admission data and update the pt on the plan of care. The pt did not appear to be able to communicate with the interrupter. Hima Tejeda will contact the pts daughter.

## 2021-04-15 NOTE — DISCHARGE INSTR - COC
Continuity of Care Form    Patient Name: Екатерина Garcia   :  10/16/1930  MRN:  [id number]    Admit date:  2021  Discharge date:  ***    Code Status Order: Full Code   Advance Directives:   Advance Care Flowsheet Documentation       Date/Time Healthcare Directive Type of Healthcare Directive Copy in [address] Agent's Name Healthcare Agent's Phone Number    04/15/21 1017  Yes, patient has an advance directive for healthcare treatment  --  No, copy requested from clinic  --  --  --            Admitting Physician:  Ros Ray MD  PCP: Cortney Russo MD    Discharging Nurse: [facility] Unit/Room#:   Discharging Unit Phone Number: ***    Emergency Contact:   Extended Emergency Contact Information  Primary Emergency Contact: Jacob Ramon Karel Manzanares Phone: [phone]  Relation: Child    Past Surgical History:  Past Surgical History:   Procedure Laterality Date    APPENDECTOMY      HERNIA REPAIR      HIP FUSION         Immunization History: There is no immunization history on file for this patient.     Active Problems:  Patient Active Problem List   Diagnosis Code    Acute cholecystitis K81.0    Atrial fibrillation ([facility]) I48.91    COPD (chronic obstructive pulmonary disease) ([facility]) J44.9    CAD (coronary artery disease) I25.10    Vascular [facility] ([facility] F01.50    Leukocytosis D72.829       Isolation/Infection:   Isolation            No Isolation          Patient Infection Status       None to display            Nurse Assessment:  Last Vital Signs: /77   Pulse 95   Temp 99.7 °F (37.6 °C) (Oral)   Resp 16   Ht 5' 10\" (1.778 m)   Wt 130 lb (59 kg)   SpO2 93%   BMI 18.65 kg/m²     Last documented pain score (0-10 scale): Pain Level: 2  Last Weight:   Wt Readings from Last 1 Encounters:   21 130 lb (59 kg)     Mental Status:  alert    IV Access:  - None    Nursing Mobility/ADLs:  Walking   Independent  Transfer Independent  Bathing  Assisted  Dressing  Assisted  Toileting  Independent  Feeding  Independent  Med Admin  Independent  Med Delivery   whole    Wound Care Documentation and Therapy:        Elimination:  Continence:   · Bowel: Yes  · Bladder: Yes  Urinary Catheter: None   Colostomy/Ileostomy/Ileal Conduit: Cholecystostomy       Date of Last BM: ***  No intake or output data in the 24 hours ending 04/15/21 1129  No intake/output data recorded. Safety Concerns: At Risk for Falls    Impairments/Disabilities:      Language Barrier - Speaks [place] and hard of hearing    Nutrition Therapy:  Current Nutrition Therapy:   - Oral Diet:  General    Routes of Feeding: Oral  Liquids: No Restrictions  Daily Fluid Restriction: no  Last Modified Barium Swallow with Video (Video Swallowing Test): not done    Treatments at the Time of Hospital Discharge:   Respiratory Treatments: ***  Oxygen Therapy:  is not on home oxygen therapy.   Ventilator:    - No ventilator support    Rehab Therapies: Physical Therapy and Occupational Therapy  Weight Bearing Status/Restrictions: No weight bearing restirctions  Other Medical Equipment (for information only, NOT a DME order):  none  Other Treatments: Skilled nursing assessment  Med teaching and compliance   Drain Care  Patient's personal belongings (please select all that are sent with patient):  None    RN SIGNATURE:  Electronically signed by Divine Nunez RN on 4/17/21 at 12:17 PM EDT    CASE MANAGEMENT/SOCIAL WORK SECTION    Inpatient Status Date: ***    Readmission Risk Assessment Score:  Readmission Risk              Risk of Unplanned Readmission:        11           Discharging to Facility/ Agency   · Name: [address] [place] [address]  · Address:  · Phone: [phone]  · Fax: [phone]    Dialysis Facility (if applicable)   · Name:  · Address:  · Dialysis Schedule:  · Phone:  · Fax:    / signature: Electronically signed by Zachary Sotomayor RN on 4/17/21 at 11:52 AM EDT    PHYSICIAN SECTION    Prognosis: Good    Condition at Discharge: Stable    Rehab Potential (if transferring to Rehab): Good    Recommended Labs or Other Treatments After Discharge: Cholecystostomy tube care, continue antibiotics as directed, follow-up with surgery as outpatient after 1 week    Physician Certification: I certify the above information and transfer of Daniel Lima  is necessary for the continuing treatment of the diagnosis listed and that he requires [address] for greater 30 days.      Update Admission H&P: No change in H&P    PHYSICIAN SIGNATURE:  Electronically signed by Amirah Brennan MD on 4/17/21 at 11:31 AM EDT

## 2021-04-15 NOTE — H&P
[facility]  Office: [address], DO, Tr Condon, DO, Kamila Boland, DO, Fili Horse Blood, DO, Tawana Dhillon MD, Shorty Emerson MD, Denzel Saenz MD, Margret Quintero MD, Trung Chinchilla MD, Geoffrey Wing MD, Rossy Maradiaga MD, Daniel Melton MD, Johny Mueller, DO, Flor Acevedo MD, Blade Gomez DO, Cordelia Barthel, MD,  Harris Green DO, Russel Toney MD, Omar Whitaker MD, Lucas Vega MD, Elodia Killian MD, Fredrick Rodriguez, [facility], [facility] Gregor, CNP, Juan Monae, CNP, Scott Montalvo, CNS, Iliana Dash, CNP, Oliverio Rapp, CNP, James Monsalve, CNP, Aguila Daniel, CNP, Funmilayo Potter, CNP, Philomena Campbell PA-C, Shy Vance, [facility], [place]), Brandon Anthony, CNP, Bay Snowden, CNP, Linus Cole, CNP, Gisselle Trujillo, CNP, Marina Rodas, CNP, Reno Jackson, [address]    HISTORY AND PHYSICAL EXAMINATION            Date:   4/15/2021  Patient name:  Mohini Amezcua  Date of admission:  4/14/2021  8:35 PM  MRN:   [id number]  Account:  [de-identified]  YOB: 1930  PCP:    Harvey Hernández MD  Room:   [place] [address]  Code Status:    No Order    Chief Complaint:     Chief Complaint   Patient presents with    Abdominal Pain       History Obtained From:     patient, electronic medical record    History of Present Illness:     Mohini Amezcua is a 80 y.o. Non-/non  male who presents with Abdominal Pain   and is admitted to the hospital for the management of Acute cholecystitis. Mr. Noel Mcgee is a 80year old male who presents with daughter to ED today with 3 day history of abdominal pain. He daughter states he ate [place] lot\" of food and sweets on Monday and since then has complained of pain. She states he has had no vomiting that she is aware of. He has also had no change in bowel or bladder. He does endorse intermittent nausea. He has no chest pain, fever, chills. He has no shortness of breath. Per his daughter: He lives in an assisted living facility. He walks with a cane. Independent in his ADLs. Takes all his prescribed medications. He does not speak [place]. He has a medical history that includes CVA, A. fib, COPD, CAD, vascular dementia and hypertension. CT abdomen/pelvis:   Acute cholecystitis. Recommend gallbladder ultrasound. Kieran       Past Medical History:     Past Medical History:   Diagnosis Date    [facility]     CAD (coronary artery disease)     COPD [facility]) [facility]     CVA [facility]cere[facility] vascular accident) [facility]     Hypertension     NSTEMI (non-ST elevated myocardial infarction) ([facility] 2016    Vascular dementia ([facility]         Past Surgical History:     Past Surgical History:   Procedure Laterality Date    APPENDECTOMY      HERNIA REPAIR      HIP FUSION          Medications Prior to Admission:     Prior to Admission medications    Medication Sig Start Date End Date Taking? Authorizing Provider   apixaban (ELIQUIS) 2.5 MG TABS tablet Take 2.5 mg by mouth 2 times daily 3/24/21  Yes Historical Provider, MD   metoprolol succinate (TOPROL XL) 25 MG extended release tablet Take 12.5 mg by mouth nightly 3/24/21  Yes Historical Provider, MD   albuterol sulfate  (90 Base) MCG/ACT inhaler INHALE 2 PUFFS EVERY 4-6 HOURS AS NEEDED FOR WHEEZE 7/20/20  Yes Historical Provider, MD   digoxin (LANOXIN) 125 MCG tablet take 1 tablet by mouth once daily 2/17/21   Historical Provider, MD        Allergies:     Patient has no known allergies. Social History:     Tobacco:    reports that he has never smoked. He does not have any smokeless tobacco history on file. Alcohol:      reports previous alcohol use. Drug Use:  has no history on file for drug. Family History:     History reviewed. No pertinent family history. Review of Systems:     Positive and Negative as described in HPI.     Review of Systems   Unable to perform ROS: Other Daughter is at bedside. Patient speaks [place] only. He lives in a nursing facility/assisted living. Physical Exam:   /68   Pulse 96   Temp 99.9 °F (37.7 °C) (Oral)   Resp 12   Wt 130 lb (59 kg)   SpO2 92%   Temp (24hrs), Av °F (37.8 °C), Min:99.9 °F (37.7 °C), Max:100 °F (37.8 °C)    No results for input(s): POCGLU in the last 72 hours. No intake or output data in the 24 hours ending 04/15/21 0205    Physical Exam  Vitals signs and nursing note reviewed. Constitutional:       General: He is not in acute distress. Appearance: Normal appearance. He is not ill-appearing. HENT:      Mouth/Throat:      Mouth: Mucous membranes are dry. Pharynx: Oropharynx is clear. No oropharyngeal exudate or posterior oropharyngeal erythema. Eyes:      Pupils: Pupils are equal, round, and reactive to light. Neck:      Musculoskeletal: Normal range of motion. Cardiovascular:      Rate and Rhythm: Normal rate and regular rhythm. Pulses: Normal pulses. Heart sounds: Normal heart sounds. Pulmonary:      Effort: Pulmonary effort is normal.      Breath sounds: Normal breath sounds. Abdominal:      General: Abdomen is flat. Bowel sounds are normal. There is no distension. Palpations: Abdomen is soft. Tenderness: There is abdominal tenderness. There is guarding. There is no rebound. Musculoskeletal: Normal range of motion. General: No swelling or tenderness. Skin:     General: Skin is warm and dry. Capillary Refill: Capillary refill takes less than 2 seconds. Coloration: Skin is pale. Neurological:      General: No focal deficit present. Mental Status: He is alert.    Psychiatric:         Mood and Affect: Mood normal.         Behavior: Behavior normal.         Investigations:      Laboratory Testing:  Recent Results (from the past 24 hour(s))   CBC Auto Differential    Collection Time: 21  8:55 PM   Result Value Ref Range    WBC 20.8 (H) 3.5 - 11.3 k/uL    RBC 4.03 (L) 4.21 - 5.77 m/uL    Hemoglobin 12.9 (L) 13.0 - 17.0 g/dL    Hematocrit 38.3 (L) 40.7 - 50.3 %    MCV 95.0 82.6 - 102.9 fL    MCH 32.0 25.2 - 33.5 pg    MCHC 33.7 28.4 - 34.8 g/dL    RDW 13.7 11.8 - 14.4 %    Platelets 097 117 - 701 k/uL    MPV 10.8 8.1 - 13.5 fL    NRBC Automated 0.0 0.0 per 100 WBC    Differential Type NOT REPORTED     Seg Neutrophils 87 (H) 36 - 65 %    Lymphocytes 5 (L) 24 - 43 %    Monocytes 7 3 - 12 %    Eosinophils % 0 (L) 1 - 4 %    Basophils 0 0 - 2 %    Immature Granulocytes 1 (H) 0 %    Segs Absolute 17.94 (H) 1.50 - 8.10 k/uL    Absolute Lymph # 0.94 (L) 1.10 - 3.70 k/uL    Absolute Mono # 1.49 (H) 0.10 - 1.20 k/uL    Absolute Eos # <0.03 0.00 - 0.44 k/uL    Basophils Absolute 0.06 0.00 - 0.20 k/uL    Absolute Immature Granulocyte 0.30 0.00 - 0.30 k/uL    WBC Morphology NOT REPORTED     RBC Morphology NOT REPORTED     Platelet Estimate NOT REPORTED    Basic Metabolic Panel    Collection Time: 04/14/21  8:55 PM   Result Value Ref Range    Glucose 121 (H) 70 - 99 mg/dL    BUN 16 8 - 23 mg/dL    CREATININE 1.00 0.70 - 1.20 mg/dL    Bun/Cre Ratio 16 9 - 20    Calcium 9.4 8.6 - 10.4 mg/dL    Sodium 134 (L) 135 - 144 mmol/L    Potassium 4.1 3.7 - 5.3 mmol/L    Chloride 99 98 - 107 mmol/L    CO2 21 20 - 31 mmol/L    Anion Gap 14 9 - 17 mmol/L    GFR Non-African American >60 >60 mL/min    GFR African American >60 >60 mL/min    GFR Comment          GFR Staging NOT REPORTED    Lipase    Collection Time: 04/14/21  8:55 PM   Result Value Ref Range    Lipase 23 13 - 60 U/L   Amylase    Collection Time: 04/14/21  8:55 PM   Result Value Ref Range    Amylase 60 28 - 100 U/L   Hepatic Function Panel    Collection Time: 04/14/21  8:55 PM   Result Value Ref Range    Albumin 3.9 3.5 - 5.2 g/dL    Alkaline Phosphatase 92 40 - 129 U/L    ALT 10 5 - 41 U/L    AST 23 <40 U/L    Total Bilirubin 1.06 0.3 - 1.2 mg/dL    Bilirubin, Direct 0.29 <0.31 mg/dL    Bilirubin, Indirect 0.77 0.00 - 1.00 mg/dL    Total Protein 7.6 6.4 - 8.3 g/dL    Globulin NOT REPORTED 1.5 - 3.8 g/dL    Albumin/Globulin Ratio NOT REPORTED 1.0 - 2.5   Urinalysis Reflex to Culture    Collection Time: 04/14/21 10:15 PM    Specimen: Urine, clean catch   Result Value Ref Range    Color, UA YELLOW YELLOW    Turbidity UA CLEAR CLEAR    Glucose, Ur NEGATIVE NEGATIVE    Bilirubin Urine NEGATIVE NEGATIVE    Ketones, Urine NEGATIVE NEGATIVE    Specific Gravity, UA 1.034 (H) 1.005 - 1.030    Urine Hgb 2+ (A) NEGATIVE    pH, UA 5.0 5.0 - 8.0    Protein, UA 1+ (A) NEGATIVE    Urobilinogen, Urine Normal Normal    Nitrite, Urine NEGATIVE NEGATIVE    Leukocyte Esterase, Urine NEGATIVE NEGATIVE    Urinalysis Comments NOT REPORTED    Microscopic Urinalysis    Collection Time: 04/14/21 10:15 PM   Result Value Ref Range    -          WBC, UA 0 TO 2 0 - 5 /HPF    RBC, UA 5 TO 10 0 - 2 /HPF    Casts UA 2 TO 5 /LPF    Casts UA HYALINE /LPF    Casts UA 2 TO 5 /LPF    Casts UA COARSELY GRANULAR /LPF    Crystals, UA NOT REPORTED None /HPF    Epithelial Cells UA 2 TO 5 0 - 5 /HPF    Renal Epithelial, UA NOT REPORTED 0 /HPF    Bacteria, UA MODERATE (A) None    Mucus, UA NOT REPORTED None    Trichomonas, UA NOT REPORTED None    Amorphous, UA NOT REPORTED None    Other Observations UA NOT REPORTED NOT REQ. Yeast, UA NOT REPORTED None       Imaging/Diagnostics:  Ct Abdomen Pelvis W Iv Contrast Additional Contrast? None    Result Date: 4/14/2021  13 x 27 mm nodule right lung base. Acute cholecystitis. Recommend gallbladder ultrasound. Postop ileus. RECOMMENDATIONS: 20 mm right solid pulmonary nodule detected on incomplete chest CT. Recommend prompt non-contrast Chest CT for further evaluation. These guidelines do not apply to immunocompromised patients and patients with cancer. Follow up in patients with significant comorbidities as clinically warranted. For lung cancer screening, adhere to Lung-RADS guidelines. Reference: Radiology. 2017; 284(1):228-43. Assessment :      Hospital Problems           Last Modified POA    * (Principal) Acute cholecystitis 4/15/2021 Yes    At[facility] fibrillation ([facility] 4/15/2021 Yes    COPD (chronic obstructive pulmonary disease) ([facility] 4/15/2021 Yes    CAD (coronary artery disease) 4/15/2021 Yes    Vascular dementia ([facility] 4/15/2021 Yes    Leukocytosis 4/15/2021 Yes          Plan:     Patient status inpatient in the Med/Surge    1. Acute cholecystitis: Inpatient consult to general surgery. Appreciate input. Right upper quadrant ultrasound. IV hydration. NPO. Pain control. Antiemetics as needed. 2.  Atrial fibrillation: Continue Eliquis. Continue digoxin. 3.  COPD: Continuous pulse oximetry. Supplemental oxygen if needed. Respiratory eval and treat. 4.  CAD: Continue home metoprolol 12.5 p.o. nightly. 5.  Leukocytosis: CBC with differential daily. Monitor for fever. 6.  DVT prophylaxis: On anticoagulation. SCDs while in bed. 7.  GI prophylaxis: Pepcid 20 mg twice daily  8. PT/OT eval and treat    Consultations:   IP CONSULT TO GENERAL SURGERY    Patient is admitted as inpatient status because of co-morbidities listed above, severity of signs and symptoms as outlined, requirement for current medical therapies and most importantly because of direct risk to patient if care not provided in a hospital setting. Expected length of stay > 48 hours.     CHELSEY Wallace NP  4/15/2021  2:05 AM    Copy sent to Dr. Morgan Berger MD

## 2021-04-15 NOTE — PROGRESS NOTES
Direct oral anticoagulant (DOAC) - Initial Pharmacy Review  PLAN     Order is on hold. Kim Santiago  4/15/2021  6:55 AM    ASSESSMENT   Patient chart reviewed for indication and appropriateness of dose and therapy of Apixaban.:  Indication: AFib. PATIENT INFORMATION   Body mass index is 18.65 kg/m². Wt Readings from Last 1 Encounters:   21 130 lb (59 kg)     Some sources recommend that DOACs be avoided in weight < 50 or > 120 kg, or BMI > 40 whenever possible; however. some smaller studies suggest that DOACs may be safe and efficacious in this population. Recent Labs     21   CREATININE 1.00     Recent Labs     04/14/21  2055 04/15/21  0647   HGB 12.9* 12.7*   HCT 38.3* 37.4*    180     No results for input(s): INR in the last 72 hours. Weight  kg ? BMI Less than   40 kg/m2 Calculated CrCl  Using ABW (mL/min) Other anticoagulants on MAR Drug interactions  (since admission) reviewed   yes    Wt Readings from Last 1 Encounters:   21 130 lb (59 kg)    yes    Body mass index is 18.65 kg/m². Apixaban for Afib is reviewed separately        (140-age)*ABW    72 * sCr    X 0.85 for females No concurrent anticoagulants ordered.  No interactions/no new drug interactions identified requiring action.                 -----------------------------------------------------------------------------------------------------------------    CRCL Calculation for DOACs  (use ABW)    CrCl male:  (140-Age) * ABW           For female:  (140-Age) * ABW * 0.85                       72  *  sCr                                              72 * sCr           Apixaban (Eliquis)  Indication Dose (Oral) Renal Adjustment (CrCl calculated on ABW) Select Drug Interactions   NVAF 5mg BID 2.5mg BID  IF patient has TWO of the following:  Age>80, Weight <60 kg, SCr > 1.5  (If dialysis, but not meeting above criteria, keep 5 mg BID*)   Strong P-gp/CY inducers (Avoid combination)  Apalutamide, suggested)  Hip: 10mg daily x 35 days CrCl <15 =Avoid use    CAD/PAD  (Chronic, stable) 2.5mg BID with once daily ASA 75-100mg No adjustment for > 15 mL/min. Use with caution in severe renal impairment. * Keegan JONES et al. J Am Soc Nephrol 2017. doi:10.1681/ASN. [id number]  *Brendan CUEVAS et al. Circulation 2018.  WCV:02.7546/QGNRYWNYUGPOTR  Jeff Peters et al. [facility] 2019  Kishor River M, et al. Blood 2781;989:340  [facility] December, et al. CHEST 2018  Guy Zuniga et al. Maria A Pharmacother 2019  [facility]/kami OLSON al. Circulation 2018

## 2021-04-15 NOTE — CARE COORDINATION
Case Management Initial Discharge Plan  Aldo Avila,             Met with:family member patient and daughter to discuss discharge plans. Information verified: address, contacts, phone number, , insurance Yes  PCP: Nikky Zelaya MD  Date of last visit: 3-    Insurance Provider: Alda Duff Dual    Discharge Planning    Living Arrangements:  Alone in [address] Support Systems:  Family Members    Home has 3rd story apt with elevator  0  stairs to climb to get into front door, 0 stairs to climb to reach second floor  Location of bedroom/bathroom in home  - main floor    Patient able to perform ADL's:Assisted    Current Services (outpatient & in home) SN, HHA  DME equipment: walker, cane  DME provider: N/A    Pharmacy: [address] [place] [address] [place] [facility] [employer]    Potential Assistance Needed:  [facility].    Patient agreeable to home care: Yes  Freedom of choice provided:  yes    Prior SNF/Rehab Placement and Facility: No  Agreeable to SNF/Rehab: No  [place] of choice provided: n/a   Evaluation: n/a    Expected Discharge date:  21  Patient expects to be discharged to:  HOME  Follow Up Appointment: Best Day/ Time: Monday PM    Transportation provider: daughter  Transportation arrangements needed for discharge: No    Readmission Risk              Risk of Unplanned Readmission:        11             Does patient have a readmission risk score greater than 14?: No  If yes, follow-up appointment must be made within 7 days of discharge. Goal of Care:       Discharge Plan: Pt speaks [place] and spoke to dtr to discuss D/C plan. Lives at [address] with elevator. Has been independent and uses cane. Has history of CVA, dementia and on Eliquis for Afib. Admitted for abd pain and surgery consulted. Dtr stated she feels pt ate too many sweets and started having pain during the night Tuesday. Eliquis currently on hold for possible percutaneous cholecystotomy tube. On IV Zosyn.   Current with [address] [place] [address] for weekly SN and daily HHA and informed Soo of admission. Follow surgeries plan of care. GEORGE initiated. The Plan for Transition of Care is related to the following treatment goals: Weekly SN, daily HHA    The patient representative daughter was provided with a choice of provider and agrees   with the discharge plan. [x] Yes [] No    Freedom of choice list was provided with basic dialogue that supports the patient's individualized plan of care/goals, treatment preferences and shares the quality data associated with the providers.  [x] Yes [] No                 Electronically signed by Kaylie Wilson RN on 4/15/21 at 11:26 AM EDT

## 2021-04-15 NOTE — FLOWSHEET NOTE
[facility]  PROGRESS NOTE    Room # 2026/2026-01   Name: Nancy Austin            Judaism: [address]     Reason for visit: Routine    I visited the patient. Admit Date & Time: 4/14/2021  8:35 PM    Assessment:  Nancy Austin is a 80 y.o. male in the hospital because of abdominal pain. Upon entering the room the pt was laying in bed. Pt does not speak English and attempted to relay this to writer. Writer gave pt a spiritual care card with picture and motioned \"prayer\" to pt. Pt says, \"thank you\" in response. Writer unable to obtain spiritual assessment from pt but upon observation pt does not seem to be in any acute stress. Intervention:  I introduced myself and my title as  I offered space for the pt  to express feelings, needs, and concerns and provided a ministry presence. Writer provided card with picture. Outcome: The pt was grateful and receptive. Plan:  Chaplains will remain available to offer spiritual and emotional support as needed.         04/15/21 1259   Encounter Summary   Services provided to: Patient   Referral/Consult From: [address] Visiting   (4/15/21)   Complexity of Encounter Low   Routine   Type Initial   Assessment Coping  (does not speak [place])   Intervention Sustaining presence/ Ministry of presence   Outcome Expressed gratitude       Electronically signed by Maurilio Cortes on 4/15/2021 at 1:00 PM.  [place]rna

## 2021-04-16 ENCOUNTER — APPOINTMENT (OUTPATIENT)
Dept: INTERVENTIONAL RADIOLOGY/VASCULAR | Age: 86
DRG: 446 | End: 2021-04-16
Payer: COMMERCIAL

## 2021-04-16 LAB
ANION GAP SERPL CALCULATED.3IONS-SCNC: 11 MMOL/L (ref 9–17)
BUN BLDV-MCNC: 15 MG/DL (ref 8–23)
BUN/CREAT BLD: 17 (ref 9–20)
CALCIUM SERPL-MCNC: 8.4 MG/DL (ref 8.6–10.4)
CHLORIDE BLD-SCNC: 101 MMOL/L (ref 98–107)
CO2: 21 MMOL/L (ref 20–31)
CREAT SERPL-MCNC: 0.9 MG/DL (ref 0.7–1.2)
CULTURE: NORMAL
DIRECT EXAM: NORMAL
GFR AFRICAN AMERICAN: >60 ML/MIN
GFR NON-AFRICAN AMERICAN: >60 ML/MIN
GFR SERPL CREATININE-BSD FRML MDRD: ABNORMAL ML/MIN/{1.73_M2}
GFR SERPL CREATININE-BSD FRML MDRD: ABNORMAL ML/MIN/{1.73_M2}
GLUCOSE BLD-MCNC: 116 MG/DL (ref 70–99)
Lab: NORMAL
MAGNESIUM: 1.6 MG/DL (ref 1.6–2.6)
POTASSIUM SERPL-SCNC: 3.5 MMOL/L (ref 3.7–5.3)
SODIUM BLD-SCNC: 133 MMOL/L (ref 135–144)
SPECIMEN DESCRIPTION: NORMAL

## 2021-04-16 PROCEDURE — 6360000002 HC RX W HCPCS: Performed by: NURSE PRACTITIONER

## 2021-04-16 PROCEDURE — 97530 THERAPEUTIC ACTIVITIES: CPT

## 2021-04-16 PROCEDURE — 87070 CULTURE OTHR SPECIMN AEROBIC: CPT

## 2021-04-16 PROCEDURE — 1200000000 HC SEMI PRIVATE

## 2021-04-16 PROCEDURE — 2580000003 HC RX 258: Performed by: NURSE PRACTITIONER

## 2021-04-16 PROCEDURE — 83735 ASSAY OF MAGNESIUM: CPT

## 2021-04-16 PROCEDURE — 36415 COLL VENOUS BLD VENIPUNCTURE: CPT

## 2021-04-16 PROCEDURE — C1729 CATH, DRAINAGE: HCPCS

## 2021-04-16 PROCEDURE — 97116 GAIT TRAINING THERAPY: CPT

## 2021-04-16 PROCEDURE — 6360000002 HC RX W HCPCS: Performed by: RADIOLOGY

## 2021-04-16 PROCEDURE — 47490 INCISION OF GALLBLADDER: CPT | Performed by: RADIOLOGY

## 2021-04-16 PROCEDURE — 97535 SELF CARE MNGMENT TRAINING: CPT

## 2021-04-16 PROCEDURE — 80048 BASIC METABOLIC PNL TOTAL CA: CPT

## 2021-04-16 PROCEDURE — 99153 MOD SED SAME PHYS/QHP EA: CPT | Performed by: RADIOLOGY

## 2021-04-16 PROCEDURE — 87075 CULTR BACTERIA EXCEPT BLOOD: CPT

## 2021-04-16 PROCEDURE — 97163 PT EVAL HIGH COMPLEX 45 MIN: CPT

## 2021-04-16 PROCEDURE — 87205 SMEAR GRAM STAIN: CPT

## 2021-04-16 PROCEDURE — 6370000000 HC RX 637 (ALT 250 FOR IP): Performed by: NURSE PRACTITIONER

## 2021-04-16 PROCEDURE — 97167 OT EVAL HIGH COMPLEX 60 MIN: CPT

## 2021-04-16 PROCEDURE — 99152 MOD SED SAME PHYS/QHP 5/>YRS: CPT | Performed by: RADIOLOGY

## 2021-04-16 PROCEDURE — 0F9430Z DRAINAGE OF GALLBLADDER WITH DRAINAGE DEVICE, PERCUTANEOUS APPROACH: ICD-10-PCS | Performed by: RADIOLOGY

## 2021-04-16 PROCEDURE — 99232 SBSQ HOSP IP/OBS MODERATE 35: CPT | Performed by: INTERNAL MEDICINE

## 2021-04-16 RX ORDER — FENTANYL CITRATE 50 UG/ML
INJECTION, SOLUTION INTRAMUSCULAR; INTRAVENOUS
Status: COMPLETED | OUTPATIENT
Start: 2021-04-16 | End: 2021-04-16

## 2021-04-16 RX ADMIN — MAGNESIUM SULFATE HEPTAHYDRATE 1000 MG: 1 INJECTION, SOLUTION INTRAVENOUS at 22:11

## 2021-04-16 RX ADMIN — SODIUM CHLORIDE: 9 INJECTION, SOLUTION INTRAVENOUS at 15:41

## 2021-04-16 RX ADMIN — METOPROLOL SUCCINATE 12.5 MG: 25 TABLET, EXTENDED RELEASE ORAL at 22:12

## 2021-04-16 RX ADMIN — PIPERACILLIN AND TAZOBACTAM 3375 MG: 3; .375 INJECTION, POWDER, LYOPHILIZED, FOR SOLUTION INTRAVENOUS at 15:39

## 2021-04-16 RX ADMIN — SODIUM CHLORIDE: 9 INJECTION, SOLUTION INTRAVENOUS at 03:14

## 2021-04-16 RX ADMIN — Medication 3 MG: at 02:11

## 2021-04-16 RX ADMIN — Medication 50 MCG: at 15:12

## 2021-04-16 RX ADMIN — DIGOXIN 125 MCG: 125 TABLET ORAL at 09:34

## 2021-04-16 RX ADMIN — MAGNESIUM SULFATE HEPTAHYDRATE 1000 MG: 1 INJECTION, SOLUTION INTRAVENOUS at 23:38

## 2021-04-16 NOTE — BRIEF OP NOTE
Brief Postoperative Note    Jenise Landers  YOB: 1930  [id number]    Pre-operative Diagnosis: Acute cholecystitis    Post-operative Diagnosis: Same    Procedure: Percutaneous transhepatic 8 Fr cholecystostomy tube    Anesthesia: Local    Surgeons/Assistants: Jay    Estimated Blood Loss: less than 50     Complications: None    Specimens: Was Obtained: 10 cc of bile    Electronically signed by Marsha Cheng MD on 4/16/2021 at 3:29 PM

## 2021-04-16 NOTE — PROGRESS NOTES
Nurse attempting to restart IV. Patient pushing nurse's hand away saying \"no blood, Daughter. \" Daughter is supposed to be coming in, will attempt again after she arrives.

## 2021-04-16 NOTE — PLAN OF CARE
Ongoing  4/16/2021 0628 by Do Montez RN  Outcome: Ongoing  Goal: Occurrences of vomiting will decrease  Description: Occurrences of vomiting will decrease  4/16/2021 0629 by Do Montez RN  Outcome: Ongoing  4/16/2021 0628 by Do Montez RN  Outcome: Ongoing     Problem: Fluid Volume:  Goal: Maintenance of adequate hydration will improve  Description: Maintenance of adequate hydration will improve  4/16/2021 0629 by Do Montez RN  Outcome: Ongoing  4/16/2021 0628 by Do Montez RN  Outcome: Ongoing     Problem: Health Behavior:  Goal: Ability to state signs and symptoms to report to health care provider will improve  Description: Ability to state signs and symptoms to report to health care provider will improve  4/16/2021 0629 by Do Montez RN  Outcome: Ongoing  4/16/2021 0628 by Do Montez RN  Outcome: Ongoing     Problem: Physical Regulation:  Goal: Complications related to the disease process, condition or treatment will be avoided or minimized  Description: Complications related to the disease process, condition or treatment will be avoided or minimized  4/16/2021 0629 by Do Montez RN  Outcome: Ongoing  4/16/2021 0628 by Do Montez RN  Outcome: Ongoing  Goal: Ability to maintain clinical measurements within normal limits will improve  Description: Ability to maintain clinical measurements within normal limits will improve  4/16/2021 0629 by Do Montez RN  Outcome: Ongoing  4/16/2021 0628 by Do Montez RN  Outcome: Ongoing     Problem: Sensory:  Goal: Ability to identify factors that increase the pain will improve  Description: Ability to identify factors that increase the pain will improve  4/16/2021 0629 by Do Montez RN  Outcome: Ongoing  4/16/2021 0628 by Do Montez RN  Outcome: Ongoing  Goal: Ability to notify healthcare provider of pain before it becomes unmanageable or unbearable will improve  Description: Ability to notify healthcare provider of pain before it becomes unmanageable or unbearable will improve  4/16/2021 0629 by Hiro Thao RN  Outcome: Ongoing  4/16/2021 0628 by Hiro Thao RN  Outcome: Ongoing  Goal: Pain level will decrease  Description: Pain level will decrease  4/16/2021 0629 by Hiro Thao RN  Outcome: Ongoing  4/16/2021 0628 by Hiro Thao RN  Outcome: Ongoing

## 2021-04-16 NOTE — PROGRESS NOTES
[facility]  Office: [address], DO, Ayesha Chi, DO, Yonny Urban, DO, [place] Late Blood, DO, Lazara Sheppard MD, Andrés Alberts MD, Maria Fernanda Jauregui MD, Valentino Rose MD, Anamika Parry MD, Joy Fernandez MD, Lis Quiroga MD, Lissa Granados MD, Michael Geller, DO, Bruna Talley MD, Perla Jung, DO, Silvana Daniels MD,  Shirley Ferrari, DO, Aaron Metz MD, Renetta Zambrano MD, Betzaida Osuna MD, Maria C Shook MD, Gorge Carl, Laisha Hernandez, CNP, Moriah Chavarria, CNP, Samir Mane, CNS, Alfredo Osgood, CNP, Joyce Alert, CNP, Marycarmen Ramirez, CNP, Matt Lorenzana, CNP, Kendra Clemons, CNP, ABHILASH Fitch-C, Michelle Mathew, [facility], Tom Snyder, CNP, Nano Berry, CNP, Adan Solis, CNP, Romeo Parson, CNP, Janeth Jain, [facility], Mary Connor, [facility]    Progress Note    4/16/2021    1:58 PM    Name:   Turner Bedoya  MRN:     [id number]     Acct:      [de-identified]   Room:   2026/2026-01  IP Day:  2  Admit Date:  4/14/2021  8:35 PM    PCP:   Derrell Joaquin MD  Code Status:  DNR-CCA    Subjective:     C/C:   Chief Complaint   Patient presents with    Abdominal Pain     Interval History Status: . Condition unchanged  Likely to get cholecystostomy today by IR  Patient speaks [place], able to say yes or no only in [place] and few words, wants to  her daughter to communicate   Brief History:   Turner Bedoya is a 80 y.o. Non-/non  male who presents with Abdominal Pain   and is admitted to the hospital for the management of Acute cholecystitis.     Mr. Gee Resendiz is a 80year old male who presents with daughter to ED today with 3 day history of abdominal pain. He daughter states he ate [place] lot\" of food and sweets on Monday and since then has complained of pain. She states he has had no vomiting that she is aware of. He has also had no change in bowel or bladder.   He does endorse intermittent (58.1 kg)   SpO2 94%   BMI 18.37 kg/m²   Temp (24hrs), Av.5 °F (36.9 °C), Min:97.3 °F (36.3 °C), Max:99.5 °F (37.5 °C)    No results for input(s): POCGLU in the last 72 hours. I/O (24Hr): No intake or output data in the 24 hours ending 21 1358    Labs:  Hematology:  Recent Labs     04/14/21  2055 04/15/21  0647   WBC 20.8* 18.3*   RBC 4.03* 4.02*   HGB 12.9* 12.7*   HCT 38.3* 37.4*   MCV 95.0 93.0   MCH 32.0 31.6   MCHC 33.7 34.0   RDW 13.7 13.4    180   MPV 10.8 10.4   INR  --  1.4     Chemistry:  Recent Labs     04/14/21  2055 04/15/21  0647 21  0933   * 134* 133*   K 4.1 3.4* 3.5*   CL 99 102 101   CO2 21 20 21   GLUCOSE 121* 110* 116*   BUN 16 13 15   CREATININE 1.00 0.90 0.90   MG  --  1.5* 1.6   ANIONGAP 14 12 11   LABGLOM >60 >60 >60   GFRAA >60 >60 >60   CALCIUM 9.4 8.7 8.4*   CAION  --  1.20  --    PHOS  --  2.1*  --    DIGOXIN  --  0.4*  --      Recent Labs     04/14/21  2055 04/15/21  0647   PROT 7.6 6.6   LABALBU 3.9 3.3*   AST 23 18   ALT 10 7   ALKPHOS 92 79   BILITOT 1.06 1.12   BILIDIR 0.29  --    AMYLASE 60 48   LIPASE 23 20   TRIG  --  43     ABG:No results found for: POCPH, PHART, PH, POCPCO2, TRQ0VOV, PCO2, POCPO2, PO2ART, PO2, POCHCO3, TGV0KEV, HCO3, NBEA, PBEA, BEART, BE, THGBART, THB, FYO4HLP, BPJK2RGR, Y9ITKZCM, O2SAT, FIO2  No results found for: SPECIAL  No results found for: CULTURE    Radiology:  Ct Abdomen Pelvis W Iv Contrast Additional Contrast? None    Result Date: 2021  13 x 27 mm nodule right lung base. Acute cholecystitis. Recommend gallbladder ultrasound. Postop ileus. RECOMMENDATIONS: 20 mm right solid pulmonary nodule detected on incomplete chest CT. Recommend prompt non-contrast Chest CT for further evaluation. These guidelines do not apply to immunocompromised patients and patients with cancer. Follow up in patients with significant comorbidities as clinically warranted. For lung cancer screening, adhere to Lung-RADS guidelines. Reference: Radiology. 2017; 284(1):228-43.        Physical Examination:        General appearance:  alert, cooperative and no distress  Mental Status:  oriented to person, place and time and normal affect  Lungs:  clear to auscultation bilaterally, normal effort  Heart: Irregular rhythm, no murmur  Abdomen:  soft, + mild upper abdominal discomfort on palpation, nondistended, normal bowel sounds, no masses, hepatomegaly, splenomegaly  Extremities:  no edema, redness, tenderness in the calves  Skin:  no gross lesions, rashes, induration    Assessment:        Hospital Problems           Last Modified POA    * (Principal) Acute cholecystitis 4/15/2021 Yes    Atrial fibrillation ([address] 4/15/2021 Yes    COPD (chronic obstructive pulmonary disease) ([address] 4/15/2021 Yes    CAD (coronary artery disease) 4/15/2021 Yes    Vascular dementia ([address] 4/15/2021 Yes    Leukocytosis 4/15/2021 Yes    Language barrier-speaks [place] only 4/15/2021 Yes          Plan:        Cholecystostomy tube placement by IR possibly today  Eliquis on hold for above  Continue 136 Damian Dan MD  4/16/2021  1:58 PM

## 2021-04-16 NOTE — PROGRESS NOTES
General Surgery:  Daily Progress Note                  PATIENT NAME: Guanakito Polanco     TODAY'S DATE: 4/16/2021, 6:10 AM    SUBJECTIVE:     Pt seen and examined at bedside. No acute events overnight. RUQ pain. Afebrile. OBJECTIVE:   VITALS:  BP (!) 143/73   Pulse 108   Temp 97.3 °F (36.3 °C) (Oral)   Resp 17   Ht 5' 10\" (1.778 m)   Wt 128 lb (58.1 kg)   SpO2 94%   BMI 18.37 kg/m²      INTAKE/OUTPUT:    No intake or output data in the 24 hours ending 04/16/21 0610    PHYSICAL EXAM:  General Appearance: awake, alert, in no acute distress  HEENT:  Normocephalic, atraumatic, mucus membranes moist   Heart: s1+s2  Lungs: equal and symmetric chest rise/fall, non-labored   Abdomen:soft, nd, ttp RUQ    Extremities: No cyanosis, pitting edema, rashes noted. Skin: Skin color, texture, turgor normal. No rashes or lesions.         Data:  CBC with Differential:    Lab Results   Component Value Date    WBC 18.3 04/15/2021    RBC 4.02 04/15/2021    HGB 12.7 04/15/2021    HCT 37.4 04/15/2021     04/15/2021    MCV 93.0 04/15/2021    MCH 31.6 04/15/2021    MCHC 34.0 04/15/2021    RDW 13.4 04/15/2021    LYMPHOPCT 5 04/14/2021    MONOPCT 7 04/14/2021    BASOPCT 0 04/14/2021    MONOSABS 1.49 04/14/2021    LYMPHSABS 0.94 04/14/2021    EOSABS <0.03 04/14/2021    BASOSABS 0.06 04/14/2021    DIFFTYPE NOT REPORTED 04/14/2021     CMP:    Lab Results   Component Value Date     04/15/2021    K 3.4 04/15/2021     04/15/2021    CO2 20 04/15/2021    BUN 13 04/15/2021    CREATININE 0.90 04/15/2021    GFRAA >60 04/15/2021    LABGLOM >60 04/15/2021    GLUCOSE 110 04/15/2021    PROT 6.6 04/15/2021    LABALBU 3.3 04/15/2021    CALCIUM 8.7 04/15/2021    BILITOT 1.12 04/15/2021    ALKPHOS 79 04/15/2021    AST 18 04/15/2021    ALT 7 04/15/2021     BMP:    Lab Results   Component Value Date     04/15/2021    K 3.4 04/15/2021     04/15/2021    CO2 20 04/15/2021    BUN 13 04/15/2021    LABALBU 3.3 04/15/2021 CREATININE 0.90 04/15/2021    CALCIUM 8.7 04/15/2021    GFRAA >60 04/15/2021    LABGLOM >60 04/15/2021    GLUCOSE 110 04/15/2021     Hepatic Function Panel:    Lab Results   Component Value Date    ALKPHOS 79 04/15/2021    ALT 7 04/15/2021    AST 18 04/15/2021    PROT 6.6 04/15/2021    BILITOT 1.12 04/15/2021    BILIDIR 0.29 04/14/2021    IBILI 0.77 04/14/2021    LABALBU 3.3 04/15/2021       Radiology Review:     ASSESSMENT:  Active Hospital Problems    Diagnosis Date Noted    Atrial fibrillation [facility]) [I48.91] 04/15/2021    COPD (chronic obstructive pulmonary disease) ([facility] [J44.9] 04/15/2021    CAD (coronary artery disease) [I25.10] 04/15/2021    Vascular dementia ([facility] [F01.50] 04/15/2021    Leukocytosis [D72.829] 04/15/2021    Language barrier-speaks [place] only [Z78.9] 04/15/2021    Acute cholecystitis [K81.0] 04/14/2021       1. 80 y.o. male with acute cholecystitis     Plan:  1. Hold AC  2. Rec IR perc cholecystostomy tube placement  3. No acute surgical intervention.    4. Medical and supportive care per primary     Electronically signed by Alice Nettles DO  on 4/16/2021 at 6:10 AM

## 2021-04-16 NOTE — PLAN OF CARE
Ed strengthening ex program, NMR, transfers/gait training for fall-prevention, regain of independence & activity tolerance to [facility]

## 2021-04-16 NOTE — CARE COORDINATION
Social Work-Contacted patient's daughter to discuss discharge options. Discussed therapy recommendation of SNF for rehab. Discussed that patient required 2 assist to transfer and was unable to walk with a standard walker. Daughter does not want SNF. She states that people die at [facility]. Explained that SNF's are used for short term rehab. She stated that patient was independent before admission and was able to do push ups She would like patient to go home. She states that she can provide 24 hour care.  Rosalino Guy

## 2021-04-16 NOTE — PLAN OF CARE
maintain clinical measurements within normal limits will improve  Outcome: Ongoing     Problem: Sensory:  Goal: Ability to identify factors that increase the pain will improve  Description: Ability to identify factors that increase the pain will improve  Outcome: Ongoing  Goal: Ability to notify healthcare provider of pain before it becomes unmanageable or unbearable will improve  Description: Ability to notify healthcare provider of pain before it becomes unmanageable or unbearable will improve  Outcome: Ongoing  Goal: Pain level will decrease  Description: Pain level will decrease  Outcome: Ongoing

## 2021-04-16 NOTE — PLAN OF CARE
Behavior:  Goal: Ability to state signs and symptoms to report to health care provider will improve  Description: Ability to state signs and symptoms to report to health care provider will improve  4/16/2021 1205 by Divine Nunez RN  Outcome: Ongoing     Problem: Physical Regulation:  Goal: Complications related to the disease process, condition or treatment will be avoided or minimized  Description: Complications related to the disease process, condition or treatment will be avoided or minimized  4/16/2021 1205 by Divine Nunez RN  Outcome: Ongoing     Problem: Physical Regulation:  Goal: Ability to maintain clinical measurements within normal limits will improve  Description: Ability to maintain clinical measurements within normal limits will improve  4/16/2021 1205 by Divine Nunez RN  Outcome: Ongoing     Problem: Sensory:  Goal: Ability to identify factors that increase the pain will improve  Description: Ability to identify factors that increase the pain will improve  4/16/2021 1205 by Divine Nunez RN  Outcome: Ongoing     Problem: Sensory:  Goal: Ability to notify healthcare provider of pain before it becomes unmanageable or unbearable will improve  Description: Ability to notify healthcare provider of pain before it becomes unmanageable or unbearable will improve  4/16/2021 1205 by Divine Nunez RN  Outcome: Ongoing     Problem: Sensory:  Goal: Pain level will decrease  Description: Pain level will decrease  4/16/2021 1205 by Divine Nunez RN  Outcome: Ongoing

## 2021-04-16 NOTE — PROGRESS NOTES
Occupational Therapy   Occupational Therapy Initial Assessment  Date: 2021   Patient Name: Jyoti Ramirez  MRN: [id number]     : 10/16/1930    Date of Service: 2021  Jyoti Ramirez is a 80 y.o. male who presents to the emergency department private vehicle for evaluation of abdominal pain. Patient has generalized abdominal pain that has had for the last 3 days. The daughter who interprets states that he has had some nausea without vomiting. She states that he has a history of a hernia repair and an appendectomy. He did develop a bowel obstruction after hernia repair that required a small segment of his bowels to be removed. He states that he is also had some low-grade fevers.     Discharge Recommendations:  Patient would benefit from continued therapy after discharge     RN reports patient is medically stable for therapy treatment this date. Chart reviewed prior to treatment and patient is agreeable for therapy. All lines intact and patient positioned comfortably at end of treatment. All patient needs addressed prior to ending therapy session. Assessment   Performance deficits / Impairments: Decreased functional mobility ; Decreased ADL status; Decreased strength;Decreased safe awareness;Decreased cognition;Decreased endurance;Decreased posture;Decreased balance  Assessment: Pt would benefit from further therapy at d/c as pt has deficits in all functional areas and is at a high risk for falls. Prognosis: Good  Decision Making: High Complexity  OT Education: OT Role;Plan of Care;Home Exercise Program;Precautions; ADL Adaptive Strategies;Transfer Training;Energy Conservation;Equipment; Family Education  Patient Education: attempting to educate pt throughout on safety/fall prevention techs  Barriers to Learning: lang barrier, [place], cognition  REQUIRES OT FOLLOW UP: Yes  Activity Tolerance  Activity Tolerance: Patient Tolerated treatment well  Safety Devices  Safety Devices in place: Yes  Type Fair  Observation: pt has flexed posture, forward head. Pt is [place] and speaks [place]. Needs physical prompts/cues throughout  Balance  Sitting Balance: Stand by assistance  Standing Balance: Moderate assistance(x2 during mobility, min A static standing at sink)  Functional Mobility  Functional - Mobility Device: Rolling Walker  Activity: To/from bathroom  Assist Level: Moderate assistance(x2; pt needing max cues/assist to stay close to walker and to guide walker through doorway to bathroom. Pt pushing device very far foward and pt has altered gait pattern inherently anyways. R knee is flexed, hip internally rotated.)  Toilet Transfers  Toilet Transfers Comments: mod A x 2 to safely approach toilet to stand to urinate. Pt putting walker to side and lunging for grabbar, very unsafe tech with pt at high risk for falls  ADL  Feeding: Setup;Supervision  Grooming: Minimal assistance(to stand at sink for simple grooming. Pt needing inc. assist for safety with approaching sink with RW but able to stand ~3-4 min for washing face, hands and drying off.)  UE Bathing: Minimal assistance  LE Bathing: Moderate assistance  UE Dressing: Minimal assistance  LE Dressing: Moderate assistance  Toileting: Minimal assistance; Moderate assistance(pt stood at toilet to urinate. Pt needing max tactile cues for safety with approaching toilet with RW, mod A x 2 to safely get in position. Pt needing min-mod A for clothing management)  Additional Comments: pt is limited by dec. safety, dec. standing balance  Tone RUE  RUE Tone: Normotonic  Tone LUE  LUE Tone: Normotonic  Coordination  Movements Are Fluid And Coordinated: Yes     Bed mobility  Rolling to Right: Minimal assistance  Supine to Sit: Minimal assistance;2 Person assistance  Comment: mod cues/tactile assist for UE hand placement on rail and proper techs with inc. time  Transfers  Sit to stand: Moderate assistance;2 Person assistance  Stand to sit: 2 Person assistance; Moderate assistance  Transfer Comments: pt needing max tactile cues for safety with transfers including hand placement, RW positioning     Cognition  Overall Cognitive Status: Exceptions  Safety Judgement: Decreased awareness of need for safety;Decreased awareness of need for assistance  Problem Solving: Assistance required to generate solutions;Assistance required to implement solutions;Decreased awareness of errors;Assistance required to correct errors made  Insights: Decreased awareness of deficits  Initiation: Requires cues for some  Sequencing: Requires cues for some  Cognition Comment: difficulty to fully assess d/t lang barrier, however pt in general has significantly dec.  insight in to safety deficits and dec judgement  Perception  Overall Perceptual Status: WFL     Sensation  Overall Sensation Status: WFL(unable to fully assess)        LUE AROM (degrees)  LUE AROM : WFL  RUE AROM (degrees)  RUE AROM : WFL  LUE Strength  LUE Strength Comment: unable to assess as pt is unable to follow directions                   Plan   Plan  Times per week: 4-5x/week  Current Treatment Recommendations: Strengthening, Balance Training, Functional Mobility Training, Endurance Training, Safety Education & Training, Self-Care / ADL, Patient/Caregiver Education & Training, Equipment Evaluation, Education, & procurement, Positioning       AM-PAC Inpatient Daily Activity Raw Score: 16 (04/16/21 1236)  -PAC Inpatient ADL T-Scale Score : 35.96 (04/16/21 1236)  ADL Inpatient CMS 0-100% Score: 53.32 (04/16/21 1236)  ADL Inpatient CMS G-Code Modifier : CK (04/16/21 1236)    Goals  Short term goals  Time Frame for Short term goals: by discharge, pt will  Short term goal 1: demo CGA with ADL transfers with approp AD and min cues for safety  Short term goal 2: demo CGA with functional mob in room for ADL completion with min cues for safety with approp device  Short term goal 3: demo CGA with toileting routine with min cues for safety  Short term goal 4: demo SBA with UB ADLs and min A LB ADLs with approp DME and min cues for initiation/safety  Patient Goals   Patient goals : not stated. Therapy Time   Individual Concurrent Group Co-treatment   Time In 1012         Time Out 1038(+10 min chart review)         Minutes 39              Co-treatment with PT warranted secondary to decreased safety and independence requiring 2 skilled therapy professionals to address individual discipline's goals. OT addressing preparation for ADL transfer, sitting balance for increased ADL performance, sitting/activity tolerance, functional reaching, environmental safety/scanning, fall prevention, functional mobility for ADL transfers, ability to sequence and follow directions and functional UE strength.     Millicent Larson, OT

## 2021-04-16 NOTE — PLAN OF CARE
Nutrition Problem #1: Inadequate energy intake  Intervention: Food and/or Nutrient Delivery: Continue Current Diet, Start Oral Nutrition Supplement  Nutritional Goals: Diet advancement and PO intake >75% with supplements during LOS

## 2021-04-16 NOTE — PROGRESS NOTES
Comprehensive Nutrition Assessment    Type and Reason for Visit:  Positive Nutrition Screen    Nutrition Recommendations/Plan:   1.) Continue current diet   2.) Start Ensure Clear 2x/day   3.) Monitor diet advancement, PO, weight, labs, swallowing     Nutrition Assessment:  Pt admitted with acute cholecystitis. Writer spoke with daughter due to pt unable to speak English very well. Daughter states pt had great appetite before he got sick and was admitted to hospital. Usual body weight at 133# per daughter. This is a 3.8% weight loss in a weeks time. Daughter states pt has been coughing when taking drinks of liquid. She attributes this to him lying on his back. I encouraged her to still mention this to the doctor to make sure nothing else was going on. Writer mentioned this to nursing as well. Writer recommended an oral nutritional supplement for pt due to weight loss and clear liquid diet. Pt's daughter agreeable and order was placed. Will continue to monitor PO, weight, labs, swalllowing issues and follow up as needed. Malnutrition Assessment:  Malnutrition Status: At risk for malnutrition (Comment)    Context:  Acute Illness     Findings of the 6 clinical characteristics of malnutrition:  Energy Intake:  1 - 75% or less of estimated energy requirements for 7 or more days  Weight Loss:  1 - 1% to 2% over 1 week     Body Fat Loss:  Unable to assess     Muscle Mass Loss:  Unable to assess    Fluid Accumulation:  No significant fluid accumulation     Strength:  Not Performed    Estimated Daily Nutrient Needs:  Energy (kcal):  0559-4998; Weight Used for Energy Requirements:  Current     Protein (g):  70-81(1.2-1.4g/kg); Weight Used for Protein Requirements:  Current        Fluid (ml/day):  3228-3647; Method Used for Fluid Requirements:  1 ml/kcal      Nutrition Related Findings:  Coughing when drinking liquids. Labs: potassium-3.4, sodium-134, magnesium-1.5, phosphorous-2.1.       Current Nutrition Therapies:    DIET CLEAR LIQUID;  Dietary Nutrition Supplements: Clear Liquid Oral Supplement    Anthropometric Measures:  · Height: 5' 10\" (177.8 cm)  · Current Body Weight: 128 lb (58.1 kg)   · Admission Body Weight:      · Usual Body Weight: 133 lb (60.3 kg)(Per daughter)     · Ideal Body Weight: 166 lbs; % Ideal Body Weight 77.1 %   · BMI: 18.4  · BMI Categories: Underweight (BMI less than 22) age over 72       Nutrition Diagnosis:   · Inadequate energy intake related to altered GI function as evidenced by poor intake prior to admission, weight loss greater than or equal to 2% in 1 week(pain in abdomen)      Nutrition Interventions:   Food and/or Nutrient Delivery:  Continue Current Diet, Start Oral Nutrition Supplement  Nutrition Education/Counseling:  Education not indicated   Coordination of Nutrition Care:  Continue to monitor while inpatient    Goals:  Diet advancement and PO intake >75% with supplements during LOS       Nutrition Monitoring and Evaluation:   Food/Nutrient Intake Outcomes:  Diet Advancement/Tolerance, Food and Nutrient Intake  Physical Signs/Symptoms Outcomes:  Biochemical Data, Chewing or Swallowing, GI Status, Weight     Discharge Planning:     Too soon to determine     Electronically signed by Veena Andrews, MS, RD, LD on 4/16/21 at 4:16 PM EDT    Contact: Veena Andrews, MELY Gustafson  Clinical Dietitian   [phone]

## 2021-04-16 NOTE — PROGRESS NOTES
Physical Therapy    Facility/Department: [facility]  Initial Assessment    NAME: Shawn Lora  : 10/16/1930  MRN: [id number]    Date of Service: 2021    Discharge Recommendations:  Patient would benefit from continued therapy after discharge     Pt presented to ED on 21for evaluation of abdominal pain. Pt has generalized abdominal pain that has had for the last 3 days. The daughter who interprets states that he has had some nausea without vomiting. She states that he has a history of a hernia repair and an appendectomy. He did develop a bowel obstruction after hernia repair that required a small segment of his bowels to be removed. He states that he is also had some low-grade fevers. RN reports patient is medically stable for therapy treatment this date. Chart reviewed prior to treatment and patient is agreeable for therapy    Assessment   Body structures, Functions, Activity limitations: Decreased functional mobility ; Decreased strength;Decreased safe awareness;Decreased balance;Decreased endurance  Assessment: Pt with deficits of bed mobility, transfers, ambulation, balance, POOR safety awareness and endurance this session,  & required 2 assist for ALL functional mobility, transfers & gait. , & is decline compared to prior level of function. With current deficits, Pt HIGH risk for falls & requires continued PT to maximize independence with functional mobility, balance, safety awareness & activity tolerance  Prognosis: Good  Decision Making: High Complexity  Exam: ROM, MMT, functional mobility, activity tolerance, Balance, & [employer] AM-PAC 6 Clicks Basic Mobility  Clinical Presentation: evolving  PT Education: Goals;PT Role;Transfer Training;General Safety; Functional Mobility Training;Gait Training  REQUIRES PT FOLLOW UP: Yes  Activity Tolerance  Activity Tolerance: Patient limited by cognitive status; Patient limited by fatigue       Patient Diagnosis(es): The primary Objective     Observation/Palpation  Posture: Fair  Observation: resting in bed, appears comfortable    AROM RLE (degrees)  RLE AROM: WFL  AROM LLE (degrees)  LLE AROM : WFL  AROM RUE (degrees)  RUE General AROM: see OT assessment  AROM LUE (degrees)  LUE General AROM: see OT assessment  Strength RLE  Strength RLE: WFL  Strength LLE  Strength LLE: WFL  Strength RUE  Comment: see OT assessment  Strength LUE  Comment: see OT assessment  Tone RLE  RLE Tone: Normotonic  Tone LLE  LLE Tone: Normotonic  Motor Control  Gross Motor?: WFL     Bed mobility  Rolling to Right: Minimal assistance  Supine to Sit: Minimal assistance;2 Person assistance  Comment: Mod cues/tactile assist for UE hand placement on rail and proper technique with increased time needed  Transfers  Sit to Stand: Moderate Assistance;2 Person Assistance  Stand to sit: Moderate Assistance;2 Person Assistance  Bed to Chair: Moderate assistance;2 Person Assistance  Stand Pivot Transfers: Moderate Assistance;2 Person Assistance  Lateral Transfers: Moderate Assistance;2 Person Assistance  Comment: Visual Ed + tactile assist on correct use of upper body for safe sit/stand  Ambulation  Ambulation?: Yes  More Ambulation?: Yes  Ambulation 1  Surface: level tile  Device: Rolling Walker  Assistance:  Moderate assistance;2 Person assistance     Balance  Sitting - Static: Good  Sitting - Dynamic: Good  Standing - Static: Good;-(R/W)  Standing - Dynamic: Fair;+(R/W)        Plan   Plan  Times per week: 1-2x/D, 5-6D/week  Current Treatment Recommendations: Strengthening, Balance Training, Functional Mobility Training, Transfer Training, Endurance Training, Gait Training, Home Exercise Program, Safety Education & Training, Patient/Caregiver Education & Training  Safety Devices  Type of devices: Bed alarm in place, Call light within reach, Chair alarm in place, Gait belt, Patient at risk for falls, Left in chair, Nurse notified    G-Code       OutComes Score

## 2021-04-17 VITALS
TEMPERATURE: 98.4 F | WEIGHT: 138 LBS | SYSTOLIC BLOOD PRESSURE: 135 MMHG | HEART RATE: 106 BPM | HEIGHT: 70 IN | RESPIRATION RATE: 18 BRPM | BODY MASS INDEX: 19.76 KG/M2 | OXYGEN SATURATION: 92 % | DIASTOLIC BLOOD PRESSURE: 77 MMHG

## 2021-04-17 LAB
ANION GAP SERPL CALCULATED.3IONS-SCNC: 10 MMOL/L (ref 9–17)
BUN BLDV-MCNC: 16 MG/DL (ref 8–23)
BUN/CREAT BLD: 19 (ref 9–20)
CALCIUM SERPL-MCNC: 8.1 MG/DL (ref 8.6–10.4)
CHLORIDE BLD-SCNC: 103 MMOL/L (ref 98–107)
CO2: 21 MMOL/L (ref 20–31)
CREAT SERPL-MCNC: 0.86 MG/DL (ref 0.7–1.2)
GFR AFRICAN AMERICAN: >60 ML/MIN
GFR NON-AFRICAN AMERICAN: >60 ML/MIN
GFR SERPL CREATININE-BSD FRML MDRD: ABNORMAL ML/MIN/{1.73_M2}
GFR SERPL CREATININE-BSD FRML MDRD: ABNORMAL ML/MIN/{1.73_M2}
GLUCOSE BLD-MCNC: 106 MG/DL (ref 70–99)
MAGNESIUM: 2.1 MG/DL (ref 1.6–2.6)
POTASSIUM SERPL-SCNC: 3.2 MMOL/L (ref 3.7–5.3)
SODIUM BLD-SCNC: 134 MMOL/L (ref 135–144)

## 2021-04-17 PROCEDURE — 97530 THERAPEUTIC ACTIVITIES: CPT

## 2021-04-17 PROCEDURE — 83735 ASSAY OF MAGNESIUM: CPT

## 2021-04-17 PROCEDURE — 2580000003 HC RX 258: Performed by: NURSE PRACTITIONER

## 2021-04-17 PROCEDURE — 97116 GAIT TRAINING THERAPY: CPT

## 2021-04-17 PROCEDURE — 2500000003 HC RX 250 WO HCPCS: Performed by: NURSE PRACTITIONER

## 2021-04-17 PROCEDURE — 36415 COLL VENOUS BLD VENIPUNCTURE: CPT

## 2021-04-17 PROCEDURE — 99239 HOSP IP/OBS DSCHRG MGMT >30: CPT | Performed by: INTERNAL MEDICINE

## 2021-04-17 PROCEDURE — 6370000000 HC RX 637 (ALT 250 FOR IP): Performed by: NURSE PRACTITIONER

## 2021-04-17 PROCEDURE — 6370000000 HC RX 637 (ALT 250 FOR IP): Performed by: STUDENT IN AN ORGANIZED HEALTH CARE EDUCATION/TRAINING PROGRAM

## 2021-04-17 PROCEDURE — 6360000002 HC RX W HCPCS: Performed by: NURSE PRACTITIONER

## 2021-04-17 PROCEDURE — 6370000000 HC RX 637 (ALT 250 FOR IP): Performed by: INTERNAL MEDICINE

## 2021-04-17 PROCEDURE — 80048 BASIC METABOLIC PNL TOTAL CA: CPT

## 2021-04-17 RX ORDER — AMOXICILLIN AND CLAVULANATE POTASSIUM 875; 125 MG/1; MG/1
1 TABLET, FILM COATED ORAL EVERY 12 HOURS SCHEDULED
Qty: 20 TABLET | Refills: 0 | Status: SHIPPED | OUTPATIENT
Start: 2021-04-17 | End: 2021-04-27

## 2021-04-17 RX ORDER — POTASSIUM CHLORIDE 20 MEQ/1
40 TABLET, EXTENDED RELEASE ORAL ONCE
Status: COMPLETED | OUTPATIENT
Start: 2021-04-17 | End: 2021-04-17

## 2021-04-17 RX ORDER — HYDROCODONE BITARTRATE AND ACETAMINOPHEN 5; 325 MG/1; MG/1
1 TABLET ORAL EVERY 8 HOURS PRN
Qty: 10 TABLET | Refills: 0 | Status: SHIPPED | OUTPATIENT
Start: 2021-04-17 | End: 2021-04-23

## 2021-04-17 RX ORDER — AMOXICILLIN AND CLAVULANATE POTASSIUM 875; 125 MG/1; MG/1
1 TABLET, FILM COATED ORAL EVERY 12 HOURS SCHEDULED
Status: DISCONTINUED | OUTPATIENT
Start: 2021-04-17 | End: 2021-04-17 | Stop reason: HOSPADM

## 2021-04-17 RX ADMIN — DIGOXIN 125 MCG: 125 TABLET ORAL at 09:18

## 2021-04-17 RX ADMIN — FAMOTIDINE 20 MG: 10 INJECTION, SOLUTION INTRAVENOUS at 09:18

## 2021-04-17 RX ADMIN — POTASSIUM CHLORIDE 40 MEQ: 1500 TABLET, EXTENDED RELEASE ORAL at 09:18

## 2021-04-17 RX ADMIN — PIPERACILLIN AND TAZOBACTAM 3375 MG: 3; .375 INJECTION, POWDER, LYOPHILIZED, FOR SOLUTION INTRAVENOUS at 00:42

## 2021-04-17 RX ADMIN — AMOXICILLIN AND CLAVULANATE POTASSIUM 1 TABLET: 875; 125 TABLET, FILM COATED ORAL at 09:18

## 2021-04-17 NOTE — PROGRESS NOTES
goal 3:  Inc standing balance to good with device to facilitate pt independence for performance of ADL's & functional mobility, & reduce fall risk  Short term goal 4: Pt able to tolerate 30-40 min of activity to include 15-20 reps of ex, NMR & functional mobility to facilitate activity tolerance to [address]  Times per week: 1-2x/D, 5-6D/week  Current Treatment Recommendations: Strengthening, Balance Training, Functional Mobility Training, Transfer Training, Endurance Training, Gait Training, Home Exercise Program, Safety Education & Training, Patient/Caregiver Education & Training  Safety Devices  Type of devices: Nurse notified, Left in chair, Gait belt, Chair alarm in place, Call light within reach, All fall risk precautions in place     Therapy Time   Individual Concurrent Group Co-treatment   Time In  0951         Time Out  1033         Minutes  [address] [place] [address]

## 2021-04-17 NOTE — PROGRESS NOTES
[facility]  Office: [address], DO, Philippe Pollard, DO, Faisal Early, DO, Flor Muñoz, DO, Michel King MD, Aroldo Tello MD, Michel Ly MD, Paty Brandt MD, Yvonne Whaley MD, Guerrero Almeida MD, Tyler Beltran MD, Fermín Ivan MD, Fausto Curry, DO, Daina Nguyen MD, Carolann Jara DO, Job Caruso MD,  Osmin Spencer DO, Brayan Suarez MD, Derald Jeans, MD, Garrison Beard MD, Annabelle Thomas MD, Keshawn French, Maria Fernanda Magaña, CNP, Eric Hernandez CNP, Mariella West, CNS, Tiffany Melo, CNP, Nadja Perez, CNP, Leia Lin, CNP, Ilana Tuttle, CNP, George Gomez, CNP, Ana Rosa Villanueva PA-C, Konstantin Cabrera DNP, Cynthia Anderson, CNP, Patricia Boggs, CNP, Fercho Frazier, CNP, Antoine Holter, CNP, Eddie Peterson, CNP, [facility], [facility]    Progress Note    4/17/2021    11:28 AM    Name:   Macey Kulkarni  MRN:     [id number]     Acct:      [de-identified]   Room:   2026/2026-01   Day:  3  Admit Date:  4/14/2021  8:35 PM    PCP:   Sarah Montaño MD  Code Status:  DNR-CCA    Subjective:     C/C:   Chief Complaint   Patient presents with    Abdominal Pain     Interval History Status: . Had cholecystostomy tube placed yesterday 4/16/2021, bilious fluid being drained in bag  Appears to be comfortable  Tolerating diet  Patient speaks [place], able to say yes or no only in [place] and few words, tried to talk pain through  but she also could not understand his language  Brief History:   Macey Kulkarni is a 80 y.o. Non-/non  male who presents with Abdominal Pain   and is admitted to the hospital for the management of Acute cholecystitis.     Mr. Isaac Campos is a 80year old male who presents with daughter to ED today with 3 day history of abdominal pain. He daughter states he ate [place] lot\" of food and sweets on Monday and since then has complained of pain.  She states he has had no vomiting that she is aware of. He has also had no change in bowel or bladder. He does endorse intermittent nausea. He has no chest pain, fever, chills. He has no shortness of breath.       Per his daughter: He lives in an assisted living facility. He walks with a cane. Independent in his ADLs. Takes all his prescribed medications. He does not speak [place]. He has a medical history that includes CVA, A. fib, COPD, CAD, vascular dementia and hypertension.      CT abdomen/pelvis:   Acute cholecystitis.  Recommend gallbladder ultrasound.      WBC20.8       Review of Systems:     Unable to evaluate, language barrier-speaks [place] only    Medications: Allergies:  No Known Allergies    Current Meds:   Scheduled Meds:    amoxicillin-clavulanate  1 tablet Oral 2 times per day    apixaban  2.5 mg Oral BID    digoxin  125 mcg Oral Daily    metoprolol succinate  12.5 mg Oral Nightly    sodium chloride flush  5-40 mL Intravenous 2 times per day    famotidine (PEPCID) injection  20 mg Intravenous Daily     Continuous Infusions:    sodium chloride       PRN Meds: sodium chloride flush, sodium chloride, potassium chloride, magnesium sulfate, acetaminophen, HYDROcodone 5 mg - acetaminophen **OR** HYDROcodone 5 mg - acetaminophen, HYDROmorphone **OR** HYDROmorphone, promethazine **OR** ondansetron, albuterol sulfate HFA, melatonin    Data:     Past Medical History:   has a past medical history of Atrial fibrillation ([facility] CAD (coronary artery disease), COPD (chronic obstructive pulmonary disease) ([facility] [address] [facility] CVA (cerebral vascular accident) ([facility] [address] [facility] Hypertension, NSTEMI (non-ST elevated myocardial infarction) ([facility] [address] [facility] and Vascular dementia ([facility] [address] Social History:   reports that he has never smoked. He does not have any smokeless tobacco history on file. He reports previous alcohol use. Family History: History reviewed. No pertinent family history.     Vitals:  /77   Pulse 106   Temp 98.4 °F (36.9 base. Acute cholecystitis. Recommend gallbladder ultrasound. Postop ileus. RECOMMENDATIONS: 20 mm right solid pulmonary nodule detected on incomplete chest CT. Recommend prompt non-contrast Chest CT for further evaluation. These guidelines do not apply to immunocompromised patients and patients with cancer. Follow up in patients with significant comorbidities as clinically warranted. For lung cancer screening, adhere to Lung-RADS guidelines. Reference: Radiology. 2017; 284(1):228-43.        Physical Examination:        General appearance:  alert, cooperative and no distress, walked with physical therapy  Mental Status:  oriented to person, place and time and normal affect  Lungs:  clear to auscultation bilaterally, normal effort  Heart: Irregular rhythm, no murmur  Abdomen:  soft, + cholecystostomy drain attached to bag, abdomen nondistended, normal bowel sounds, no masses, hepatomegaly, splenomegaly  Extremities:  no edema, redness, tenderness in the calves  Skin:  no gross lesions, rashes, induration    Assessment:        Hospital Problems           Last Modified POA    * (Principal) Acute cholecystitis-status post cholecystostomy tube placement 4/16/2021 4/17/2021 Yes    Atrial fibrillation ([address] 4/15/2021 Yes    COPD (chronic obstructive pulmonary disease) ([address] 4/15/2021 Yes    CAD (coronary artery disease) 4/15/2021 Yes    Vascular dementia ([address] 4/15/2021 Yes    Leukocytosis 4/15/2021 Yes    Language barrier-speaks [place] only 4/15/2021 Yes          Plan:        Cholecystostomy tube care  Switch to oral Augmentin for 10 days  Eliquis being restarted, surgery okay with that  Home PT OT   Discharge home with home care, daughter did not want SNF    Glenny Escobar MD  4/17/2021  11:28 AM

## 2021-04-17 NOTE — CARE COORDINATION
Patient to return back to [facility] living. Writer faxed patient information to [facility].. Daughter to transport home.

## 2021-04-17 NOTE — PROGRESS NOTES
Discharge paperwork reviewed with daughter and patient. D/C paperwork and prescriptions given to daughter. Drain care explained and demonstrated. Supplies for drain care sent with patient's daughter. Patient discharged off unit with all belongings including his cane.

## 2021-04-17 NOTE — DISCHARGE SUMMARY
[facility]  Office: [address], DO, Cristian Lovett, DO, Conchis Horvathman, DO, Roman Muñoz, DO, Foreign Navarro MD, Tana Borwn MD, Saima Irby MD, Kate Leiva MD, Alex Lamas MD, Adia Segundo MD, Phyllis Tang MD, Janel Valadez MD, Koffi Ogden, DO, Valentin Luo MD, David Mccullough, DO, Laura Melgar MD,  Gil Lehman, DO, Romana Begin, MD, Walker Montilla MD, Tracy Santoyo MD, Isabella Mitchell MD, Sharmainealexis Gerber, [facility], [facility], CNP, [facility], CNP, Jessica Jenkins, CNS, Salvador Celaya, CNP, Anthony Vences, CNP, Lisbet Patel, CNP, Esvin Rosado, CNP, Al Morataya, CNP, Deo Jordan PA-C, Shantel Harrison, [facility], Perri Myers, CNP, Lizbeth Bowens, CNP, Rebecca Florian, CNP, Sarahy Huang, CNP, [facility], CNP, [facility]    Discharge Summary     Patient ID: Lalito Chin  :  10/16/1930   MRN: [id number]     ACCOUNT:  [de-identified]   Patient's PCP: Petey Pop MD  Admit Date: 2021   Discharge Date: 2021     Length of Stay: 3  Code Status:  Prior  Admitting Physician: Richie Howell MD  Discharge Physician: Richie Howell MD     Active Discharge Diagnoses:     Hospital Problem Lists:  Principal Problem:    Acute cholecystitis-status post cholecystostomy tube placement 2021  Active Problems:    Atrial fibrillation (HCC)    COPD (chronic obstructive pulmonary disease) ([facility]    CAD (coronary artery disease)    Vascular dementia ([facility] [address] [facility]    Leukocytosis    Language barrier-speaks [place] only  Resolved Problems:    * No resolved hospital problems. *      Admission Condition:  poor     Discharged Condition: stable    Hospital Stay:   Admitting history:  Anuja Baird a 80 y.o. Non-/non  male who presents with Abdominal Pain   and is admitted to the hospital for the management of Acute cholecystitis.     Mr. Dodie Freedman is a 80year old male who presents with daughter to ED today with 3 day history of abdominal pain. He daughter states he ate [place] lot\" of food and sweets on Monday and since then has complained of pain. She states he has had no vomiting that she is aware of. Tim Ayala has also had no change in bowel or bladder.  He does endorse intermittent nausea.  He has no chest pain, fever, chills.  He has no shortness of breath.       Per his daughter: He lives in an assisted living facility. Tim Ayala walks with a cane.  Independent in his ADLs.   Takes all his prescribed medications. Tim Ayala does not speak Minda Mayer has a medical history that includes CVA, A. fib, COPD, CAD, vascular dementia and hypertension.      CT abdomen/pelvis:   Acute cholecystitis.  Recommend gallbladder ultrasound.   [facility] Course:   Had cholecystostomy tube placed yesterday 4/16/2021, bilious fluid being drained in bag  Appears to be comfortable  Tolerating diet  Patient speaks [place], able to say yes or no only in [place] and few words, tried to talk pain through  but she also could not understand his language    Plan:         Cholecystostomy tube care  Switch to oral Augmentin for 10 days  Eliquis being restarted, surgery okay with that  Home PT OT   Discharge home with home care, daughter did not want SNF    Significant therapeutic interventions: Cholecystostomy tube placement    Significant Diagnostic Studies:   Labs / Micro:  CBC:   Lab Results   Component Value Date    WBC 18.3 04/15/2021    RBC 4.02 04/15/2021    HGB 12.7 04/15/2021    HCT 37.4 04/15/2021    MCV 93.0 04/15/2021    MCH 31.6 04/15/2021    MCHC 34.0 04/15/2021    RDW 13.4 04/15/2021     04/15/2021     BMP:    Lab Results   Component Value Date    GLUCOSE 106 04/17/2021     04/17/2021    K 3.2 04/17/2021     04/17/2021    CO2 21 04/17/2021    ANIONGAP 10 04/17/2021    BUN 16 04/17/2021    CREATININE 0.86 04/17/2021    BUNCRER 19 04/17/2021    CALCIUM 8.1 04/17/2021 LABGLOM >60 04/17/2021    GFRAA >60 04/17/2021    GFR      04/17/2021    GFR NOT REPORTED 04/17/2021     HFP:    Lab Results   Component Value Date    PROT 6.6 04/15/2021     CMP:    Lab Results   Component Value Date    GLUCOSE 106 04/17/2021     04/17/2021    K 3.2 04/17/2021     04/17/2021    CO2 21 04/17/2021    BUN 16 04/17/2021    CREATININE 0.86 04/17/2021    ANIONGAP 10 04/17/2021    ALKPHOS 79 04/15/2021    ALT 7 04/15/2021    AST 18 04/15/2021    BILITOT 1.12 04/15/2021    LABALBU 3.3 04/15/2021    ALBUMIN NOT REPORTED 04/15/2021    LABGLOM >60 04/17/2021    GFRAA >60 04/17/2021    GFR      04/17/2021    GFR NOT REPORTED 04/17/2021    PROT 6.6 04/15/2021    CALCIUM 8.1 04/17/2021     PT/INR:    Lab Results   Component Value Date    PROTIME 16.5 04/15/2021    INR 1.4 04/15/2021     PTT: No results found for: APTT  FLP:    Lab Results   Component Value Date    TRIG 43 04/15/2021     U/A:    Lab Results   Component Value Date    COLORU YELLOW 04/14/2021    TURBIDITY CLEAR 04/14/2021    SPECGRAV 1.034 04/14/2021    HGBUR 2+ 04/14/2021    PHUR 5.0 04/14/2021    PROTEINU 1+ 04/14/2021    GLUCOSEU NEGATIVE 04/14/2021    KETUA NEGATIVE 04/14/2021    BILIRUBINUR NEGATIVE 04/14/2021    UROBILINOGEN Normal 04/14/2021    NITRU NEGATIVE 04/14/2021    LEUKOCYTESUR NEGATIVE 04/14/2021     TSH:  No results found for: TSH     Radiology:  Ct Abdomen Pelvis W Iv Contrast Additional Contrast? None    Result Date: 4/14/2021  13 x 27 mm nodule right lung base. Acute cholecystitis. Recommend gallbladder ultrasound. Postop ileus. RECOMMENDATIONS: 20 mm right solid pulmonary nodule detected on incomplete chest CT. Recommend prompt non-contrast Chest CT for further evaluation. These guidelines do not apply to immunocompromised patients and patients with cancer. Follow up in patients with significant comorbidities as clinically warranted. For lung cancer screening, adhere to Lung-RADS guidelines.  Reference: any pharmacy    Bring a paper prescription for each of these medications  amoxicillin-clavulanate 875-125 MG per tablet  HYDROcodone-acetaminophen 5-325 MG per tablet         No discharge procedures on file. Time Spent on discharge is  35 mins in patient examination, evaluation, counseling as well as medication reconciliation, prescriptions for required medications, discharge plan and follow up. Electronically signed by   Honey Villaseñor MD  4/17/2021  4:56 PM      Thank you Dr. Jenny Kenny MD for the opportunity to be involved in this patient's care.

## 2021-04-17 NOTE — PLAN OF CARE
Problem: SAFETY  Goal: Free from accidental physical injury  Outcome: Ongoing  Note: Patient is a fall risk during this admission. Fall risk assessment was performed. Patient is absent of falls. Bed is in the lowest position. Wheels on the bed are locked. Call light and bed side table are within reach. Clutter is removed. Patient was educated to call out when needing assistance or wanting to get out of bed. Patient offered toileting assistance during rounding. Hourly rounds have been performed. Problem: PAIN  Goal: Patient's pain/discomfort is manageable  Outcome: Ongoing  Note: Pt medicated with pain medication prn. Assessed all pain characteristics including level, type, location, frequency, and onset. Non-pharmacologic interventions offered to pt as well. Pt states pain is tolerable at this time. Will continue to monitor      Problem: KNOWLEDGE DEFICIT  Goal: Patient/S.O. demonstrates understanding of disease process, treatment plan, medications, and discharge instructions. Outcome: Ongoing     Problem: Activity:  Goal: Risk for activity intolerance will decrease  Description: Risk for activity intolerance will decrease  Outcome: Ongoing  Note: Patient continues to work with PT/OT. Able to ambulate well with a rolling walker. Patient will have home PT/OT at discharge. Proper exercises taught to patient and patient demonstrates understanding. Problem: Fluid Volume:  Goal: Maintenance of adequate hydration will improve  Description: Maintenance of adequate hydration will improve  Outcome: Ongoing  Note: IVF\"s continued as ordered. Patient able to tolerate PO fluids well. Laboratory results continue to be monitored closely.

## 2021-04-17 NOTE — PLAN OF CARE
Problem: SAFETY  Goal: Free from accidental physical injury  4/17/2021 1257 by Rey Melton RN  Outcome: Completed  4/17/2021 0744 by Milo Urban RN  Outcome: Ongoing  Note: Patient is a fall risk during this admission. Fall risk assessment was performed. Patient is absent of falls. Bed is in the lowest position. Wheels on the bed are locked. Call light and bed side table are within reach. Clutter is removed. Patient was educated to call out when needing assistance or wanting to get out of bed. Patient offered toileting assistance during rounding. Hourly rounds have been performed. Goal: Free from intentional harm  4/17/2021 1257 by Rey Melton RN  Outcome: Completed  4/17/2021 0744 by Milo Urban RN  Outcome: Ongoing     Problem: DAILY CARE  Goal: Daily care needs are met  4/17/2021 1257 by Rey Melton RN  Outcome: Completed  4/17/2021 0744 by Milo Urban RN  Outcome: Ongoing     Problem: PAIN  Goal: Patient's pain/discomfort is manageable  4/17/2021 1257 by Rey Melton RN  Outcome: Completed  4/17/2021 0744 by Milo Urban RN  Outcome: Ongoing  Note: Pt medicated with pain medication prn. Assessed all pain characteristics including level, type, location, frequency, and onset. Non-pharmacologic interventions offered to pt as well. Pt states pain is tolerable at this time. Will continue to monitor      Problem: KNOWLEDGE DEFICIT  Goal: Patient/S.O. demonstrates understanding of disease process, treatment plan, medications, and discharge instructions. 4/17/2021 1257 by Rey Melton RN  Outcome: Completed  4/17/2021 0744 by Milo Urban RN  Outcome: Ongoing     Problem: DISCHARGE BARRIERS  Goal: Patient's continuum of care needs are met  4/17/2021 1257 by Rey Melton RN  Outcome: Completed  4/17/2021 0744 by Milo Urban RN  Outcome: Ongoing     Problem:  Activity:  Goal: Risk for activity intolerance will decrease  Description: Risk for activity intolerance will decrease  4/17/2021 1257 by Indu Mejias RN  Outcome: Completed  4/17/2021 0744 by Anai Edwards RN  Outcome: Ongoing  Note: Patient continues to work with PT/OT. Able to ambulate well with a rolling walker. Patient will have home PT/OT at discharge. Proper exercises taught to patient and patient demonstrates understanding. Problem: Bowel/Gastric:  Goal: Bowel function will improve  Description: Bowel function will improve  4/17/2021 1257 by Indu Mejias RN  Outcome: Completed  4/17/2021 0744 by Anai Edwards RN  Outcome: Ongoing  Goal: Diagnostic test results will improve  Description: Diagnostic test results will improve  4/17/2021 1257 by Indu Mejias RN  Outcome: Completed  4/17/2021 0744 by Anai Edwards RN  Outcome: Ongoing  Goal: Occurrences of nausea will decrease  Description: Occurrences of nausea will decrease  4/17/2021 1257 by Indu Mejias RN  Outcome: Completed  4/17/2021 0744 by Anai Edwards RN  Outcome: Ongoing  Goal: Occurrences of vomiting will decrease  Description: Occurrences of vomiting will decrease  4/17/2021 1257 by Indu Mejias RN  Outcome: Completed  4/17/2021 0744 by Anai Edwards RN  Outcome: Ongoing     Problem: Fluid Volume:  Goal: Maintenance of adequate hydration will improve  Description: Maintenance of adequate hydration will improve  4/17/2021 1257 by Indu Mejias RN  Outcome: Completed  4/17/2021 0744 by Anai Edwards RN  Outcome: Ongoing  Note: IVF\"s continued as ordered. Patient able to tolerate PO fluids well. Laboratory results continue to be monitored closely.        Problem: Health Behavior:  Goal: Ability to state signs and symptoms to report to health care provider will improve  Description: Ability to state signs and symptoms to report to health care provider will improve  4/17/2021 1257 by Giovanni Venegas RN  Outcome: Completed  4/17/2021 0744 by Candido Owen RN  Outcome: Ongoing     Problem: Physical Regulation:  Goal: Complications related to the disease process, condition or treatment will be avoided or minimized  Description: Complications related to the disease process, condition or treatment will be avoided or minimized  4/17/2021 1257 by Giovanni Venegas RN  Outcome: Completed  4/17/2021 0744 by Candido Owen RN  Outcome: Ongoing  Goal: Ability to maintain clinical measurements within normal limits will improve  Description: Ability to maintain clinical measurements within normal limits will improve  4/17/2021 1257 by Giovanni Venegas RN  Outcome: Completed  4/17/2021 0744 by Candido Owen RN  Outcome: Ongoing     Problem: Sensory:  Goal: Ability to identify factors that increase the pain will improve  Description: Ability to identify factors that increase the pain will improve  4/17/2021 1257 by Giovanni Venegas RN  Outcome: Completed  4/17/2021 0744 by Candido Owen RN  Outcome: Ongoing  Goal: Ability to notify healthcare provider of pain before it becomes unmanageable or unbearable will improve  Description: Ability to notify healthcare provider of pain before it becomes unmanageable or unbearable will improve  4/17/2021 1257 by Giovanni Venegas RN  Outcome: Completed  4/17/2021 0744 by Candido Owen RN  Outcome: Ongoing  Goal: Pain level will decrease  Description: Pain level will decrease  4/17/2021 1257 by Giovanni Venegas RN  Outcome: Completed  4/17/2021 0744 by Candido Owen RN  Outcome: Ongoing     Problem: IP BALANCE  Goal: LTG - Patient will maintain balance to allow for safe/functional mobility  4/17/2021 1257 by Giovanni Venegas RN  Outcome: Completed  4/17/2021 0744 by Candido Owen RN  Outcome: Ongoing     Problem: IP MOBILITY  Goal: LTG - patient will ambulate community distance  4/17/2021 1257 by Joseph Acuna RN  Outcome: Completed  4/17/2021 0744 by Teddi Romberg, RN  Outcome: Ongoing  Goal: LTG - patient will ambulate household distance  4/17/2021 1257 by Joseph Acuna, RN  Outcome: Completed  4/17/2021 0744 by Teddi Romberg, RN  Outcome: Ongoing  Goal: LTG - patient will demonstrate safe mobility requirements  4/17/2021 1257 by Joseph Acuna RN  Outcome: Completed  4/17/2021 0744 by Teddi Romberg, RN  Outcome: Ongoing  Goal: STG - Patient will ambulate  4/17/2021 1257 by Joseph Acuna RN  Outcome: Completed  4/17/2021 0744 by Teddi Romberg, RN  Outcome: Ongoing     Problem: Nutrition  Goal: Optimal nutrition therapy  4/17/2021 1257 by Joseph Acuna RN  Outcome: Completed  4/17/2021 0744 by Teddi Romberg, RN  Outcome: Ongoing

## 2021-04-17 NOTE — PROGRESS NOTES
General Surgery:  Daily Progress Note            PATIENT NAME: Teri Smith     TODAY'S DATE: 4/17/2021, 5:57 AM    SUBJECTIVE:     Pt seen and examined at bedside this morning. No acute events overnight. Afebrile. Patient does report some mild pain in his right upper quadrant. 175 mL output from cholecystostomy tube overnight. Currently tolerating clear liquid diet. Review of systems:  General: Denies fever, chills  Cardiac: Denies chest pain  Respiratory: Denies cough, shortness of breath  Abdomen: Denies nausea, vomiting  MSK: Denies musculoskeletal pain    OBJECTIVE:   VITALS:  BP (!) 144/56   Pulse 96   Temp 97.9 °F (36.6 °C) (Oral)   Resp 16   Ht 5' 10\" (1.778 m)   Wt 138 lb (62.6 kg)   SpO2 95%   BMI 19.80 kg/m²      INTAKE/OUTPUT:      Intake/Output Summary (Last 24 hours) at 4/17/2021 0557  Last data filed at 4/17/2021 0525  Gross per 24 hour   Intake --   Output 175 ml   Net -175 ml       PHYSICAL EXAM:  General Appearance:  awake, alert, oriented, in no acute distress  HEENT:  Normocephalic, atraumatic, mucus membranes moist   Skin:  Skin color, texture, turgor normal. No rashes or lesions. Lungs:  No chest wall tenderness. Heart:  Heart regular rate and rhythm  Abdomen: Soft, minimal right upper quadrant tenderness near drain site, nondistended, no peritoneal signs, no rebound tenderness  Extremities: Extremities warm to touch, pink, with no edema.         Data:  CBC with Differential:    Lab Results   Component Value Date    WBC 18.3 04/15/2021    RBC 4.02 04/15/2021    HGB 12.7 04/15/2021    HCT 37.4 04/15/2021     04/15/2021    MCV 93.0 04/15/2021    MCH 31.6 04/15/2021    MCHC 34.0 04/15/2021    RDW 13.4 04/15/2021    LYMPHOPCT 5 04/14/2021    MONOPCT 7 04/14/2021    BASOPCT 0 04/14/2021    MONOSABS 1.49 04/14/2021    LYMPHSABS 0.94 04/14/2021    EOSABS <0.03 04/14/2021    BASOSABS 0.06 04/14/2021    DIFFTYPE NOT REPORTED 04/14/2021     BMP:    Lab Results   Component Value Date     04/16/2021    K 3.5 04/16/2021     04/16/2021    CO2 21 04/16/2021    BUN 15 04/16/2021    LABALBU 3.3 04/15/2021    CREATININE 0.90 04/16/2021    CALCIUM 8.4 04/16/2021    GFRAA >60 04/16/2021    LABGLOM >60 04/16/2021    GLUCOSE 116 04/16/2021       ASSESSMENT:  Active Hospital Problems    Diagnosis Date Noted    Atrial fibrillation ([facility] [address] [I48.91] 04/15/2021    COPD (chronic obstructive pulmonary disease) ([facility] [address] [J44.9] 04/15/2021    CAD (coronary artery disease) [I25.10] 04/15/2021    Vascular dementia ([facility] [address] [F01.50] 04/15/2021    Leukocytosis [D72.829] 04/15/2021    Language barrier-speaks Cymro only [Z78.9] 04/15/2021    Acute cholecystitis [K81.0] 04/14/2021       1. 80 y.o. male postop day one status post placement of right upper quadrant cholecystostomy tube. Plan:  1. Patient seen examined at bedside this morning. 2. Tolerating clear liquid diet, will advance to low-fat diet this morning. 3. We will follow-up morning labs. 4. Encouraged patient to be out of bed to ambulate/up to chair  5. Continue medical management per primary  6. We will continue to monitor right upper quadrant drain output. Electronically signed by Josh Gomes DO  on 4/17/2021 at 5:57 AM     Attending Physician Statement  I have discussed the case, including pertinent history and exam findings with the resident. I agree with the assessment, plan and orders as documented by the resident. Patient seen and examined. Agree with above. Some mild discomfort at his drain site and in the right upper quadrant. His drain is draining fairly clear bilious drainage. Cultures are pending. Will advance him to a low-fat diet this morning. Discussed previously with his daughter the plan for treating him with antibiotics, this percutaneous drain, and re-evaluation of his gallbladder in the office as an outpatient to discuss further management.   He will need to be discharged home on oral antibiotics for 10-14 days. I have no issues with in resuming his blood thinning medications at this point.

## 2021-04-21 LAB
CULTURE: NORMAL
DIRECT EXAM: NORMAL
DIRECT EXAM: NORMAL
Lab: NORMAL
SPECIMEN DESCRIPTION: NORMAL